# Patient Record
Sex: FEMALE | Race: WHITE | NOT HISPANIC OR LATINO | Employment: FULL TIME | URBAN - METROPOLITAN AREA
[De-identification: names, ages, dates, MRNs, and addresses within clinical notes are randomized per-mention and may not be internally consistent; named-entity substitution may affect disease eponyms.]

---
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
## 2018-01-01 ENCOUNTER — COMMUNICATION - HEALTHEAST (OUTPATIENT)
Dept: FAMILY MEDICINE | Facility: CLINIC | Age: 18
End: 2018-01-01

## 2018-07-23 ENCOUNTER — OFFICE VISIT - HEALTHEAST (OUTPATIENT)
Dept: FAMILY MEDICINE | Facility: CLINIC | Age: 18
End: 2018-07-23

## 2018-07-23 DIAGNOSIS — N76.0 VAGINITIS: ICD-10-CM

## 2018-07-23 DIAGNOSIS — N92.0 MENORRHAGIA: ICD-10-CM

## 2018-07-23 DIAGNOSIS — L70.9 ACNE: ICD-10-CM

## 2018-07-23 LAB
CLUE CELLS: NORMAL
TRICHOMONAS, WET PREP: NORMAL
YEAST, WET PREP: NORMAL

## 2018-07-23 ASSESSMENT — MIFFLIN-ST. JEOR: SCORE: 1384.1

## 2018-07-24 ENCOUNTER — COMMUNICATION - HEALTHEAST (OUTPATIENT)
Dept: FAMILY MEDICINE | Facility: CLINIC | Age: 18
End: 2018-07-24

## 2018-10-11 ENCOUNTER — COMMUNICATION - HEALTHEAST (OUTPATIENT)
Dept: FAMILY MEDICINE | Facility: CLINIC | Age: 18
End: 2018-10-11

## 2018-10-23 ENCOUNTER — COMMUNICATION - HEALTHEAST (OUTPATIENT)
Dept: FAMILY MEDICINE | Facility: CLINIC | Age: 18
End: 2018-10-23

## 2018-11-14 ENCOUNTER — OFFICE VISIT - HEALTHEAST (OUTPATIENT)
Dept: FAMILY MEDICINE | Facility: CLINIC | Age: 18
End: 2018-11-14

## 2018-11-14 DIAGNOSIS — J01.10 ACUTE NON-RECURRENT FRONTAL SINUSITIS: ICD-10-CM

## 2019-04-30 ENCOUNTER — OFFICE VISIT - HEALTHEAST (OUTPATIENT)
Dept: FAMILY MEDICINE | Facility: CLINIC | Age: 19
End: 2019-04-30

## 2019-04-30 DIAGNOSIS — K04.7 DENTAL INFECTION: ICD-10-CM

## 2019-04-30 ASSESSMENT — MIFFLIN-ST. JEOR: SCORE: 1408.77

## 2019-05-02 ENCOUNTER — COMMUNICATION - HEALTHEAST (OUTPATIENT)
Dept: FAMILY MEDICINE | Facility: CLINIC | Age: 19
End: 2019-05-02

## 2019-07-17 ENCOUNTER — OFFICE VISIT - HEALTHEAST (OUTPATIENT)
Dept: FAMILY MEDICINE | Facility: CLINIC | Age: 19
End: 2019-07-17

## 2019-07-17 DIAGNOSIS — Z00.00 ROUTINE GENERAL MEDICAL EXAMINATION AT A HEALTH CARE FACILITY: ICD-10-CM

## 2019-07-17 ASSESSMENT — MIFFLIN-ST. JEOR: SCORE: 1389.54

## 2019-07-18 LAB
BKR LAB AP ABNORMAL BLEEDING: NO
BKR LAB AP BIRTH CONTROL/HORMONES: NORMAL
BKR LAB AP CERVICAL APPEARANCE: NORMAL
BKR LAB AP GYN ADEQUACY: NORMAL
BKR LAB AP GYN INTERPRETATION: NORMAL
BKR LAB AP HPV REFLEX: NORMAL
BKR LAB AP LMP: NORMAL
BKR LAB AP PATIENT STATUS: NORMAL
BKR LAB AP PREVIOUS ABNORMAL: NORMAL
BKR LAB AP PREVIOUS NORMAL: NORMAL
C TRACH DNA SPEC QL PROBE+SIG AMP: NEGATIVE
HIGH RISK?: NO
N GONORRHOEA DNA SPEC QL NAA+PROBE: NEGATIVE
PATH REPORT.COMMENTS IMP SPEC: NORMAL
RESULT FLAG (HE HISTORICAL CONVERSION): NORMAL

## 2019-09-08 ENCOUNTER — COMMUNICATION - HEALTHEAST (OUTPATIENT)
Dept: FAMILY MEDICINE | Facility: CLINIC | Age: 19
End: 2019-09-08

## 2019-09-17 ENCOUNTER — OFFICE VISIT - HEALTHEAST (OUTPATIENT)
Dept: FAMILY MEDICINE | Facility: CLINIC | Age: 19
End: 2019-09-17

## 2019-09-17 DIAGNOSIS — N92.0 MENORRHAGIA WITH REGULAR CYCLE: ICD-10-CM

## 2019-09-17 DIAGNOSIS — Z23 FLU VACCINE NEED: ICD-10-CM

## 2019-09-17 RX ORDER — DESOGESTREL AND ETHINYL ESTRADIOL 0.15-0.03
1 KIT ORAL DAILY
Qty: 3 PACKAGE | Refills: 3 | Status: SHIPPED | OUTPATIENT
Start: 2019-09-17 | End: 2021-07-13

## 2019-09-17 ASSESSMENT — MIFFLIN-ST. JEOR: SCORE: 1394.97

## 2019-10-28 ENCOUNTER — OFFICE VISIT - HEALTHEAST (OUTPATIENT)
Dept: FAMILY MEDICINE | Facility: CLINIC | Age: 19
End: 2019-10-28

## 2019-10-28 DIAGNOSIS — N76.0 ACUTE VAGINITIS: ICD-10-CM

## 2019-10-28 LAB
CLUE CELLS: NORMAL
TRICHOMONAS, WET PREP: NORMAL
YEAST, WET PREP: NORMAL

## 2019-10-29 LAB
C TRACH DNA SPEC QL PROBE+SIG AMP: NEGATIVE
N GONORRHOEA DNA SPEC QL NAA+PROBE: NEGATIVE

## 2019-11-04 ENCOUNTER — COMMUNICATION - HEALTHEAST (OUTPATIENT)
Dept: FAMILY MEDICINE | Facility: CLINIC | Age: 19
End: 2019-11-04

## 2019-12-22 ENCOUNTER — COMMUNICATION - HEALTHEAST (OUTPATIENT)
Dept: SCHEDULING | Facility: CLINIC | Age: 19
End: 2019-12-22

## 2020-08-04 ENCOUNTER — COMMUNICATION - HEALTHEAST (OUTPATIENT)
Dept: FAMILY MEDICINE | Facility: CLINIC | Age: 20
End: 2020-08-04

## 2020-08-10 ENCOUNTER — OFFICE VISIT - HEALTHEAST (OUTPATIENT)
Dept: FAMILY MEDICINE | Facility: CLINIC | Age: 20
End: 2020-08-10

## 2020-08-10 DIAGNOSIS — R19.7 DIARRHEA, UNSPECIFIED TYPE: ICD-10-CM

## 2020-08-10 DIAGNOSIS — N92.0 MENORRHAGIA WITH REGULAR CYCLE: ICD-10-CM

## 2020-08-10 RX ORDER — ACETAMINOPHEN AND CODEINE PHOSPHATE 120; 12 MG/5ML; MG/5ML
1 SOLUTION ORAL DAILY
Qty: 90 TABLET | Refills: 2 | Status: SHIPPED | OUTPATIENT
Start: 2020-08-10 | End: 2021-07-13

## 2020-09-17 ENCOUNTER — COMMUNICATION - HEALTHEAST (OUTPATIENT)
Dept: FAMILY MEDICINE | Facility: CLINIC | Age: 20
End: 2020-09-17

## 2021-04-26 ENCOUNTER — COMMUNICATION - HEALTHEAST (OUTPATIENT)
Dept: CARDIOLOGY | Facility: CLINIC | Age: 21
End: 2021-04-26

## 2021-05-28 NOTE — PROGRESS NOTES
Assessment/Plan:    Dede Robertson is a 19 y.o. female presenting for:    1. Dental infection  I discussed with the patient that I do not believe that she has any sort of a tonsillar abscess that her tonsils actually look fine.  She does have a slightly inflamed submandibular lymph node on the left I think this is most likely due to her dental/gum infection.  She is allergic to amoxicillin and thus clindamycin will be sent to the pharmacy.  I did discuss that this has a tendency to give people diarrhea and I would like her to start taking a probiotic which she will do.    We discussed in detail the signs and symptoms that would necessitate further follow-up either here in clinic or in the emergency department.  She will keep me updated regarding her progress.  - clindamycin (CLEOCIN) 300 MG capsule; Take 1 capsule (300 mg total) by mouth 3 (three) times a day for 7 days.  Dispense: 21 capsule; Refill: 0        Medications Discontinued During This Encounter   Medication Reason     norgestimate-ethinyl estradiol (SPRINTEC, 28,) 0.25-35 mg-mcg per tablet            Chief Complaint:  Chief Complaint   Patient presents with     Oral Pain     L side swelling back by molers- that there was a popcorn kernal back there but now really swollen- x 1 wk     Headache     Since Friday- comes and goes     Fatigue     Comes when she has her headaches       Subjective:   Dede Robertson is a pleasant 19-year-old female presenting to the clinic today with concerns over pain in her mouth, throat and ear.  Patient notes that she was eating some popcorn about 4 5 days ago and had some pain behind her left lower molar.  She states that after a day or 2 she was able to extract the kernel from the area however the area was very swollen.  Sometimes when she eats he gets a little bit better.  She has not necessarily noted any tonsillar swelling.  She has been doing some salt water gargles.  Intermittent ibuprofen which helps a  "little.    She also notes that she has had some pressure on the left side of her face as well as some pain in her ear.  She notes a little bit of pain in her left submandibular lymph nodes as well.    Otherwise she is eating and drinking fairly well.  She is never had dental infections in the past.  She wonders about a tonsillar stone or abscess as she was doing some reading online.    12 point review of systems completed and negative except for what has been described above    Social History     Tobacco Use   Smoking Status Never Smoker   Smokeless Tobacco Never Used       Current Outpatient Medications   Medication Sig     clindamycin (CLINDAGEL) 1 % gel Use to face once daily     tretinoin (RETIN-A) 0.01 % gel Apply topically daily. Apply to affected area at bedtime     clindamycin (CLEOCIN) 300 MG capsule Take 1 capsule (300 mg total) by mouth 3 (three) times a day for 7 days.         Objective:  Vitals:    04/30/19 1014   BP: 98/64   Pulse: 76   Resp: 12   Temp: 97.7  F (36.5  C)   TempSrc: Oral   Weight: 133 lb 7 oz (60.5 kg)   Height: 5' 8\" (1.727 m)       Body mass index is 20.29 kg/m .    Vital signs reviewed and stable  General: No acute distress  Psych: Appropriate affect  HEENT: moist mucous membranes, pupils equal, round, reactive to light and accomodation, posterior oropharynx clear of erythema or exudate and tonsills are not at all enlarged, the tissue just behind the left lower molar is inflamed with a little bit of exudate or pus.  I do not feel any notable abscess.  The left lower molar is not tender to touch, tympanic membranes are pearly grey bilaterally  Lymph: no cervical or supraclavicular lymphadenopathy  Cardiovascular: regular rate and rhythm with no murmur  Pulmonary: clear to auscultation bilaterally with no wheeze  Abdomen: soft, non tender, non distended with normo-active bowel sounds  Extremities: warm and well perfused with no edema  Skin: warm and dry with no rash         This note " has been dictated and transcribed using voice recognition software.   Any errors in transcription are unintentional and inherent to the software.

## 2021-05-30 NOTE — PROGRESS NOTES
Assessment/Plan:     Health maintenance female exam.  All questions answered.  Await pap smear results.  Breast self exam technique reviewed and patient encouraged to perform self-exam monthly.  Discussed healthy lifestyle modifications.    1. Routine general medical examination at a health care facility  The patient and I had a very era discussion regarding birth control.  I strongly encouraged her to get on birth control she is not using condoms every time she has intercourse.  She agrees to try Seasonique.  This was sent to the pharmacy.  She is due to have her menstrual cycle in the next few days.  She will wait till after that is done to start.  Encouraged her to use a second form of protection even while she is on birth control but it does not become effective for about 10 to 14 days and she is aware of this.  - Gynecologic Cytology (PAP Smear)  - Tdap vaccine,  8yo or older,  IM  - L norgest/e.estradiol-e.estrad (SEASONIQUE) 0.15 mg-30 mcg (84)/10 mcg (7) 3MPk; Take 1 tablet by mouth daily.  Dispense: 1 Package; Refill: 3  - Chlamydia trachomatis & Neisseria gonorrhoeae, Amplified Detection          Subjective:      Dede Robertson is a 19 y.o. female who presents for an annual exam.  She is overall doing well.  She really has no questions or concerns today.  She is due for a Pap smear.    She is sexually active with one partner.  They have been dating for quite some time.  She is using condoms most times with intercourse.    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  She does not particularly like her job at this point and is looking for a different position.    Healthy Habits:   Regular Exercise: Yes  Sunscreen Use: Yes  Healthy Diet: Yes  Dental Visits Regularly: Yes  Seat Belt: Yes  Sexually active: Yes  Self Breast Exam Monthly:Yes  Colonoscopy: no  Prevention of Osteoporosis: Yes  Last Dexa: Yes    Immunization History   Administered Date(s) Administered     DTaP, historic  1998, 1998, 06/23/1999, 05/23/2003, 05/27/2008     Dtap 1998     HIB (HBOC) 1998     HPV Quadrivalent 07/31/2013, 10/11/2013, 04/10/2014     Hep B, Peds or Adolescent 1998     Hep B, historic 1998, 03/22/1999, 04/06/2008     Hib (PRP-T) 1998, 1998, 03/22/1999, 05/27/2008     IPV 1998, 1998, 06/23/1999, 05/23/2003     Influenza, Live, Nasal LAIV3 11/24/2008, 08/24/2009     Influenza, inj, historic,unspecified 11/26/2003, 12/15/2006, 08/24/2009     Influenza, nasal, historic 12/26/2003, 11/27/2007     Influenza, seasonal,quad inj 36+ mos 11/18/2016, 10/11/2017     Influenza, seasonal,quad inj 6-35 mos 11/10/2005, 11/24/2008, 10/19/2012, 10/19/2012, 11/26/2013, 12/01/2014     MMR 06/23/1999, 04/03/2001, 06/23/2008     Meningococcal B (PF) 06/02/2016, 07/20/2016     Meningococcal MCV4P 08/24/2009, 06/02/2016     Tdap 08/24/2009, 07/17/2019     Varicella 03/22/1999, 08/24/2009     Immunization status: up to date and documented.    Gynecologic History  Patient's last menstrual period was 06/19/2019 (exact date).  Contraception: condoms  Last Pap: n/a.      OB History   No data available       Current Outpatient Medications   Medication Sig Dispense Refill     L norgest/e.estradiol-e.estrad (SEASONIQUE) 0.15 mg-30 mcg (84)/10 mcg (7) 3MPk Take 1 tablet by mouth daily. 1 Package 3     No current facility-administered medications for this visit.      Past Medical History:   Diagnosis Date     Infectious mononucleosis 11/18/2015     Past Surgical History:   Procedure Laterality Date     WISDOM TOOTH EXTRACTION       Amoxicillin  Family History   Problem Relation Age of Onset     No Medical Problems Mother      No Medical Problems Father      Cancer Paternal Grandmother      Diabetes Paternal Grandfather      Heart disease Neg Hx      Social History     Socioeconomic History     Marital status: Single     Spouse name: Not on file     Number of children: Not on file  "    Years of education: Not on file     Highest education level: Not on file   Occupational History     Not on file   Social Needs     Financial resource strain: Not on file     Food insecurity:     Worry: Not on file     Inability: Not on file     Transportation needs:     Medical: Not on file     Non-medical: Not on file   Tobacco Use     Smoking status: Never Smoker     Smokeless tobacco: Never Used   Substance and Sexual Activity     Alcohol use: No     Drug use: No     Sexual activity: Never   Lifestyle     Physical activity:     Days per week: Not on file     Minutes per session: Not on file     Stress: Not on file   Relationships     Social connections:     Talks on phone: Not on file     Gets together: Not on file     Attends [employer] service: Not on file     Active member of club or organization: Not on file     Attends meetings of clubs or organizations: Not on file     Relationship status: Not on file     Intimate partner violence:     Fear of current or ex partner: Not on file     Emotionally abused: Not on file     Physically abused: Not on file     Forced sexual activity: Not on file   Other Topics Concern     Not on file   Social History Narrative    Lives with mother (Marybeth) and father (Kiko).    Older brother.       Review of Systems  General:  Denies problems  Eyes:  Denies problems  Ears/Nose/Throat:  Denies problems  Cardiovascular:  Denies problems  Respiratory:  Denies problems  Gastrointestinal:  Denies problems  Genitourinary:  Denies problems  Musculoskeletal:  Denies problems  Skin:  Denies problems, Neurologic:  Denies problems  Psychiatric:  Denies problems  Endocrine:  Denies problems  Heme/Lymphatic:  Denies problems  Allergic/Immunologic:  Denies problems       Objective:         Vitals:    07/17/19 1335   BP: 98/64   Pulse: 76   Resp: 16   Temp: 98  F (36.7  C)   TempSrc: Oral   Weight: 130 lb 3 oz (59.1 kg)   Height: 5' 7.72\" (1.72 m)       Physical Exam:  General Appearance: Alert, " cooperative, no distress, appears stated age   Head: Normocephalic, without obvious abnormality, atraumatic  Eyes: PERRL, conjunctiva/corneas clear, EOM's intact   Ears: Normal TM's and external ear canals, both ears  Nose:Nares normal, septum midline,mucosa normal, no drainage    Throat:Lips, mucosa, and tongue normal; teeth and gums normal  Neck: Supple, symmetrical, trachea midline, no adenopathy;  thyroid: not enlarged, symmetric, no tenderness/mass/nodules  Back: Symmetric, no curvature, ROM normal,  Lungs: Clear to auscultation bilaterally, respirations unlabored  Breasts: No breast masses, tenderness, asymmetry, or nipple discharge.  Heart: Regular rate and rhythm, S1 and S2 normal, no murmur, rub, or gallop  Abdomen: Soft, non-tender, bowel sounds active all four quadrants,  no masses, no organomegaly  Pelvic:normal external female genitalia, normal appearing vaginal mucosa and cervix  Extremities: Extremities normal, atraumatic, no cyanosis or edema  Skin: Skin color, texture, turgor normal, no rashes or lesions  Lymph nodes: Cervical, supraclavicular, and axillary nodes normal and   Neurologic: Normal

## 2021-05-31 VITALS — HEIGHT: 68 IN | BODY MASS INDEX: 20.45 KG/M2 | WEIGHT: 134.9 LBS

## 2021-05-31 VITALS — HEIGHT: 68 IN | WEIGHT: 133.7 LBS | BODY MASS INDEX: 20.26 KG/M2

## 2021-05-31 VITALS — WEIGHT: 131.25 LBS | BODY MASS INDEX: 19.89 KG/M2 | HEIGHT: 68 IN

## 2021-05-31 VITALS — BODY MASS INDEX: 19.87 KG/M2 | WEIGHT: 130.7 LBS

## 2021-06-01 VITALS — BODY MASS INDEX: 19.4 KG/M2 | WEIGHT: 128 LBS | HEIGHT: 68 IN

## 2021-06-01 NOTE — TELEPHONE ENCOUNTER
Dr. Giang-  See pt's Social IQ (Social Influence Quotient) message and reply via Social IQ (Social Influence Quotient).

## 2021-06-01 NOTE — PROGRESS NOTES
Assessment/Plan:    Dede Robertson is a 19 y.o. female presenting for:    1. Menorrhagia with regular cycle  She seemed to be having breakthrough bleeding with the Seasonique.  She is tried Sprintec in the past but this gave her acne.  She feels as though he has caused her some emotional concerns.  A pre-sent to the pharmacy.  Hopefully the formulation of progesterone desogestrel will not cause her any skin concerns.  I discussed that if she may have some spotting throughout her cycles for the next month or 2 but hopefully by 3 months she will be accustomed to the medication.  - desogestrel-ethinyl estradiol (APRI) 0.15-0.03 mg per tablet; Take 1 tablet by mouth daily.  Dispense: 3 Package; Refill: 3    2. Flu vaccine need  - Influenza,Seasonal,Quad,INJ =/>6months        Medications Discontinued During This Encounter   Medication Reason     L norgest/e.estradiol-e.estrad (SEASONIQUE) 0.15 mg-30 mcg (84)/10 mcg (7) 3MPk            Chief Complaint:  Chief Complaint   Patient presents with     Contraception     Discuss birth control she's taking currently       Subjective:   Dede Robertson is a pleasant 19-year-old female presenting to the clinic today to discuss her birth control.  I seen the patient for physical about 2 months ago.  At that time she stated that she was sexually active but not using protection because of that she was started on oral contraceptive pills.  She had been on several pills in the past including Erlinda (which she felt as though she had emotional issues with), Sprintec which she felt made her break out and Ortho Tri-Cyclen which she had breakthrough spotting.  Most recently she was started on Seasonique.  She noted that after about 6 weeks she began to have some spotting.    She comes to the clinic today for further evaluation.  She was having some cramping but that has abated.      12 point review of systems completed and negative except for what has been described above    Social History  "    Tobacco Use   Smoking Status Never Smoker   Smokeless Tobacco Never Used       Current Outpatient Medications   Medication Sig     desogestrel-ethinyl estradiol (APRI) 0.15-0.03 mg per tablet Take 1 tablet by mouth daily.         Objective:  Vitals:    09/17/19 1642   BP: 110/68   Pulse: 76   Resp: 16   Temp: 98.4  F (36.9  C)   TempSrc: Oral   Weight: 131 lb 6 oz (59.6 kg)   Height: 5' 7.72\" (1.72 m)       Body mass index is 20.14 kg/m .    Vital signs reviewed and stable  General: No acute distress  Psych: Appropriate affect  HEENT: moist mucous membranes  Lymph: no cervical or supraclavicular lymphadenopathy  Cardiovascular: regular rate and rhythm with no murmur  Pulmonary: clear to auscultation bilaterally with no wheeze  Abdomen: soft, non tender, non distended with normo-active bowel sounds  Extremities: warm and well perfused with no edema  Skin: warm and dry with no rash   genitourinary: Normal external female genitalia, normal-appearing vaginal mucosa and cervix.           This note has been dictated and transcribed using voice recognition software.   Any errors in transcription are unintentional and inherent to the software.  "

## 2021-06-02 VITALS — WEIGHT: 134.8 LBS | BODY MASS INDEX: 20.5 KG/M2

## 2021-06-02 NOTE — PROGRESS NOTES
Assessment/Plan:    Dede Robertson is a 19 y.o. female presenting for:    1. Acute vaginitis  Wet prep is negative today.  Given her clinical examination as well as her symptoms I will treat with 1 tablet of Diflucan.  Discussed the risks and most common side effects of the medication.  She will contact me if she is not feeling better in the next week or so.  GC chlamydia is pending.  - Wet Prep, Vaginal  - Chlamydia trachomatis & Neisseria gonorrhoeae, Amplified Detection  - fluconazole (DIFLUCAN) 150 MG tablet; Take 1 tablet (150 mg total) by mouth once for 1 dose. OK to repeat in 3 days for continued symptoms.  Dispense: 2 tablet; Refill: 0        There are no discontinued medications.        Chief Complaint:  Chief Complaint   Patient presents with     Vaginitis       Subjective:   Dede Robertson is a pleasant 19-year-old female presented to the clinic today with concerns over vaginitis.  The patient states that when she is on her.  She generally uses tampons.  She felt as though she forgot a tampon in the vaginal canal and believes that she irritated the vaginal canal she was looking for it.  She has noted a small amount of white discharge.  Some irritation on both internally and externally.  Really no concerns for STDs.    12 point review of systems completed and negative except for what has been described above    Social History     Tobacco Use   Smoking Status Never Smoker   Smokeless Tobacco Never Used       Current Outpatient Medications   Medication Sig     desogestrel-ethinyl estradiol (APRI) 0.15-0.03 mg per tablet Take 1 tablet by mouth daily.     fluconazole (DIFLUCAN) 150 MG tablet Take 1 tablet (150 mg total) by mouth once for 1 dose. OK to repeat in 3 days for continued symptoms.         Objective:  Vitals:    10/28/19 1452   BP: 122/74   Pulse: 88   Resp: 12   Temp: 97.9  F (36.6  C)   TempSrc: Oral   Weight: 126 lb (57.2 kg)       Body mass index is 19.32 kg/m .    Vital signs reviewed and  stable  General: No acute distress  Psych: Appropriate affect  HEENT: moist mucous membranes  Abdomen: soft, non tender, non distended with normo-active bowel sounds  Genitourinary: External genitalia is slightly erythematous, vaginal mucosa has some whitish chunky discharge.  No foul-smelling discharge.  Wet prep and gonorrhea chlamydia test obtained.  Extremities: warm and well perfused with no edema  Skin: warm and dry with no rash         This note has been dictated and transcribed using voice recognition software.   Any errors in transcription are unintentional and inherent to the software.

## 2021-06-03 VITALS
BODY MASS INDEX: 19.91 KG/M2 | DIASTOLIC BLOOD PRESSURE: 68 MMHG | HEART RATE: 76 BPM | WEIGHT: 131.38 LBS | HEIGHT: 68 IN | SYSTOLIC BLOOD PRESSURE: 110 MMHG | RESPIRATION RATE: 16 BRPM | TEMPERATURE: 98.4 F

## 2021-06-03 VITALS
SYSTOLIC BLOOD PRESSURE: 122 MMHG | TEMPERATURE: 97.9 F | DIASTOLIC BLOOD PRESSURE: 74 MMHG | HEART RATE: 88 BPM | WEIGHT: 126 LBS | BODY MASS INDEX: 19.32 KG/M2 | RESPIRATION RATE: 12 BRPM

## 2021-06-03 VITALS — WEIGHT: 130.19 LBS | HEIGHT: 68 IN | BODY MASS INDEX: 19.73 KG/M2

## 2021-06-03 VITALS — WEIGHT: 133.44 LBS | BODY MASS INDEX: 20.22 KG/M2 | HEIGHT: 68 IN

## 2021-06-04 NOTE — TELEPHONE ENCOUNTER
Pt calling that pt has fever 101.4, started feeling sick yesterday morning - pt coughing, chills, body aches, throat hurts.  Pt was exposed to people who were sick earlier in the week.  Pt intends to monitor and follow Care Advice and will f/u with clinic if needed.    Mom intends to monitor and follow Care Advice and will f/u with clinic if needed.

## 2021-06-10 NOTE — PROGRESS NOTES
"Dede Robertson is a 20 y.o. female who is being evaluated via a billable video visit.      The patient has been notified of following:     \"This video visit will be conducted via a call between you and your physician/provider. We have found that certain health care needs can be provided without the need for an in-person physical exam.  This service lets us provide the care you need with a video conversation.  If a prescription is necessary we can send it directly to your pharmacy.  If lab work is needed we can place an order for that and you can then stop by our lab to have the test done at a later time.    Video visits are billed at different rates depending on your insurance coverage. Please reach out to your insurance provider with any questions.    If during the course of the call the physician/provider feels a video visit is not appropriate, you will not be charged for this service.\"    Patient has given verbal consent to a Video visit? Yes  How would you like to obtain your AVS? AVS Preference: [facility].  If dropped by the video visit, the video invitation should be sent to: [place]  Will anyone else be joining your video visit? No        Video Start Time: 9:01 AM    -------------------------    Assessment/Plan:    Dede Robertson is a 20 y.o. female presenting for:    1. Menorrhagia with regular cycle  Because her diarrhea does seem somewhat related to the increase in estrogen after her sugar pill week I think it is reasonable to try progesterone only birth control.  She is toward the end of her pill pack and will skip her placebo week and start taking her progesterone only tablets to be fully covered.  Discussed that she needs to take these at the same time every day.  If this tends to work well would recommend a longer acting implant such as a Nexplanon, IUD or even the Depo-Provera shot.  - norethindrone (ORTHO MICRONOR) 0.35 mg tablet; Take 1 tablet (0.35 mg total) by mouth daily.  Dispense: 90 " tablet; Refill: 2    2. Diarrhea, unspecified type  Etiology is unclear.  Probiotics do seem to be helping.  I do wonder if her one episode of more severe diarrhea was potentially due to a viral infection.  We will switch her birth control to progesterone only to see if this helps.  If it does not would recommend either seeing GI or potentially could consider doing a colonoscopy to look for either Crohn's or inflammatory bowel disease.    If colonoscopy is negative could consider doing a trial of Bentyl to see if this is beneficial as well if needed.        There are no discontinued medications.        Chief Complaint:  Chief Complaint   Patient presents with     Abdominal Pain     Pain started Friday of last wk- has been off and on     Medication Refill     Birth control       Subjective:   Dede Robertson is a pleasant 20 y.o. female being evaluated via video visit today for the following concern/s:     1.  Diarrhea: Patient notes that she has had an upset stomach for the last few months.  She is taking a probiotic which seems to help.  She notes that when she was eating beef her stomach would get very upset and she has cut that out which did seem to help a little.  She does sometimes have some pain and pressure in her stomach which is generally on the left-hand side which is relieved with bowel movements.  She notes that about 10 days ago she went for a run.  She then came home and ate dinner and was feeling fine however about an hour or 2 later she began to have intense abdominal pain and was up most of the night with nonbloody diarrhea.    She noted blood in her stool one time when she was a bit constipated and note this this was a scant amount.  Otherwise no blood in the stool.  She does notice that the diarrhea does not correlate with her menstrual cycle but does sometimes correlate with restarting her birth control after her placebo week.    2.  Medication refill: Patient needs a refill of her birth  control.      12 point review of systems completed and negative except for what has been described above    Social History     Tobacco Use   Smoking Status Never Smoker   Smokeless Tobacco Never Used       Current Outpatient Medications   Medication Sig     desogestrel-ethinyl estradiol (APRI) 0.15-0.03 mg per tablet Take 1 tablet by mouth daily.     norethindrone (ORTHO MICRONOR) 0.35 mg tablet Take 1 tablet (0.35 mg total) by mouth daily.         Objective:  No flowsheet data found.        There is no height or weight on file to calculate BMI.    General: No acute distress  Psych: Appropriate affect  HEENT: moist mucous membranes  Pulmonary: Breathing comfortably, speaking in complete sentences  Extremities: warm and well perfused with no edema  Skin: warm and dry with no rash       This note has been dictated and transcribed using voice recognition software.   Any errors in transcription are unintentional and inherent to the software.      Video-Visit Details    Type of service:  Video Visit    Video End Time (time video stopped): 9:22 AM  Originating Location (pt. Location): Home    Distant Location (provider location):  [facility]     Platform used for Video Visit: Nighat Giang MD

## 2021-06-11 NOTE — PROGRESS NOTES
ASSESSMENT/ PLAN    1. Acne  2. Heavy periods  16-year-old female here for refill of her birth control that was originally prescribed for heavy periods and acne.  She reports that her symptoms have not improved.  She tolerates Ortho Tri-Cyclen well without any significant side effects.  She would like a different birth control that will address her acne and her periods little bit more.  Reasonable to switch her to Lakshmi which is FDA approved for acne treatment.  Patient comfortable with this plan. Prescription sent.  Advised patient to return for follow-up and let us know if symptoms are not improved. Maybe consider spironolactone in the future.   - drospirenone-ethinyl estradiol (LAKSHMI, 28,) 3-0.02 mg per tablet; Take 1 tablet by mouth daily.  Dispense: 84 tablet; Refill: 3    Lucy Giordano MD      SUBJECTIVE   Dede Robertson is a 17 y.o. old female with a past medical history of heavy periods and acne who presented to clinic today for follow-up of birth control refill.  She has been on Ortho Tri-Cyclen for the last year, tolerating the medication well.  Denies any abnormal intermenstrual vaginal bleeding, or any other symptoms.  Ortho Tri-Cyclen was originally prescribed for heavy periods and acne and patient reports that the symptoms have not improved significantly.  She continues to have acne as well as heavy periods associated with cramps.  She has been followed by her dermatologist to address her acne and was given oral antibiotics as well as topical tretinoin cream without any relief of her acne.  He has not tried oral tretinoin for her acne yet.     Review of Systems:  A 12 point comprehensive review of systems was negative except as noted in HPI.    Medical History  Active Ambulatory (Non-Hospital) Problems    Diagnosis     Heavy periods     Acne     Past Medical History:   Diagnosis Date     Infectious mononucleosis 11/18/2015       Surgical History  She  has a past surgical history that includes [place]  "tooth extraction.    Social History  Reviewed, and she  reports that she has never smoked. She does not have any smokeless tobacco history on file.    Family History  Reviewed, and family history includes Cancer in her paternal grandmother; Diabetes in her paternal grandfather; No Medical Problems in her father and mother. There is no history of Heart disease.    Medications  Reviewed and reconciled.      Allergies  Allergies   Allergen Reactions     Amoxicillin          OBJECTIVE  Physical Exam:  Vital signs: /62 (Patient Site: Right Arm, Patient Position: Sitting, Cuff Size: Adult Regular)  Pulse 78  Resp 16  Ht 5' 8.25\" (1.734 m)  Wt 134 lb 14.4 oz (61.2 kg)  LMP 06/29/2017 (Exact Date)  Breastfeeding? No  BMI 20.36 kg/m2  Weight: 134 lb 14.4 oz (61.2 kg)    General appearance: pleasant, appears stated age, cooperative and in no distress  Eyes: EOMs intact, PERRL, conjunctivae normal.   ENT: moist oral mucosa, posterior oropharynx normal.  Lymph: no cervical/supraclavicular adenopathy  Respiratory: clear to auscultation bilaterally, good air movement throughout, no wheezing or crackles, speaking full sentences without difficulty  Cardiovascular: regular rate and rhythm, no murmur appreciated, no leg edema  Abdomen: active bowel sounds, soft, non-tender, non-distended, no CVA tenderness on percussion.  Skin: no rashes  Neuro: alert oriented x 3, grossly normal otherwise  Psych: normal affect, appropriate conversation       "

## 2021-06-12 NOTE — PROGRESS NOTES
"  Subjective:       Dede Robertson is a 17 y.o. female who presents for a discussion of menorrhagia and acne.  She saw Dr. Giordano for this in July, was changed from Ortho Tri-Cyclen to Lakshmi for its beneficial effects on acne.  She finds that her skin looks a bit better than previous, she also notes that her periods seem to be a bit lighter and shorter than previous.  She still experiences some cramping with these.  She still does have some comedonal acne.  She stopped using her tretinoin for unclear reasons.  With dermatology, she was placed on an oral antibiotic and tretinoin.  She has no other questions or concerns today.    The following portions of the patient's history were reviewed and updated as appropriate: allergies, current medications, past medical history and problem list.    Review of Systems   Pertinent items are noted in HPI.      Objective:      /64 (Patient Site: Right Arm, Patient Position: Sitting, Cuff Size: Adult Regular)  Pulse 76  Resp 16  Ht 5' 8\" (1.727 m)  Wt 133 lb 11.2 oz (60.6 kg)  LMP 08/24/2017 (Exact Date)  Breastfeeding? No  BMI 20.33 kg/m2    General appearance: alert, appears stated age and cooperative  Lungs: clear to auscultation bilaterally  Heart: regular rate and rhythm, S1, S2 normal, no murmur, click, rub or gallop  Skin: comedonal acne forehead, cheeks, chin        Assessment/Plan:       1. Acne vulgaris  Discussed that she can certainly stay on Lakshmi if this seems beneficial for her acne.  Discussed increased risk of blood clots with drospirenone.  Overall the risk remains low and she has no personal or family history of blood clots.  Also recommended restarting tretinoin and discussed her daily skin care routine.  She can follow-up as needed.  - tretinoin (RETIN-A) 0.01 % gel; Apply topically daily. Apply to affected area at bedtime  Dispense: 45 g; Refill: 5  - drospirenone-ethinyl estradiol (LAKSHMI, 28,) 3-0.02 mg per tablet; Take 1 tablet by mouth daily.  " Dispense: 84 tablet; Refill: 3    2. Menorrhagia with regular cycle  Doing well on Lakshmi at present.  Happy with these results.  She will continue this.  - drospirenone-ethinyl estradiol (LAKSHMI, 28,) 3-0.02 mg per tablet; Take 1 tablet by mouth daily.  Dispense: 84 tablet; Refill: 3

## 2021-06-13 NOTE — PROGRESS NOTES
"Assessment/Plan:    Dede Robertson is a 17 y.o. female presenting for:    1. Abdominal pain  Suspicion is that this pain has more to do with possibly mild reflux or a gastroenteritis.  We will do an ultrasound to rule out gallstones and also to look at the pancreas.  Lab work done recently was normal.  I recommended that she begin taking a probiotic as well as Zantac once or twice daily for a few weeks to see if this will help as well.  Certainly these are not things that she will need to be on long-term.  She will keep me updated and we discussed Signs and symptoms that would necessitate further follow-up.    - US Abdomen Complete; Future        There are no discontinued medications.        Chief Complaint:  Chief Complaint   Patient presents with     Abdominal Pain     Started with \"looser\" stool then some stomach cramping on the L- now it is on both sides of abd- cramping goes away after a bowel movement but pain is still there       Subjective:   Dede Robertson He is a pleasant 17-year-old female presenting to the clinic today with her mother to discuss some stomach cramping and loose stools.  She does not really have history of issues with  gastroenterologic processes.  She states about 3 weeks ago she was having some mild stomach cramping and looser stools.  The loose stools lasted for a few weeks and the cramping became worse.  Mostly the cramping was in the upper left originally and then was the upper right.  She was actually seen at urgent care a few days ago where lab work including CBC and lipase were done which were normal.  This is of course reassuring however no cause of her pain was found at that point.  She feels as though she is having normal bowel movements now and her loose stools have overall abated.  She does not feel at all constipated.  She notes that her pain will come and go but is sometimes worse after eating.  It was at its very worst after eating a hamburger a few days ago.  Again " "it is both in the right and left upper quadrants.  At the moment her pain is rated at a 1 out of 10 and is very mild.  She cannot do anything necessarily to exacerbate the pain such as moved to different positions or lift something heavy.  Sometimes after bowel movement her pain is improved but not always.  No blood in the stool.    No fevers or chills.  She has not been sick recently.    12 point review of systems completed and negative except for what has been described above    History   Smoking Status     Never Smoker   Smokeless Tobacco     Never Used       Current Outpatient Prescriptions   Medication Sig     drospirenone-ethinyl estradiol (LAKSHMI, 28,) 3-0.02 mg per tablet Take 1 tablet by mouth daily.     tretinoin (RETIN-A) 0.01 % gel Apply topically daily. Apply to affected area at bedtime         Objective:  Vitals:    10/11/17 1039   BP: 110/68   Pulse: 72   Resp: 16   Temp: 97.8  F (36.6  C)   TempSrc: Oral   Weight: 131 lb 4 oz (59.5 kg)   Height: 5' 8\" (1.727 m)       Vital signs reviewed and stable  General: No acute distress  Psych: Appropriate affect  HEENT: moist mucous membranes, pupils equal, round, reactive to light and accomodation, posterior oropharynx clear of erythema or exudate, tympanic membranes are pearly grey bilaterally  Lymph: no cervical or supraclavicular lymphadenopathy  Cardiovascular: regular rate and rhythm with no murmur  Pulmonary: clear to auscultation bilaterally with no wheeze  Abdomen: soft, mild tenderness in epigastrum and RUQ/LUQ, non distended with normo-active bowel sounds  Extremities: warm and well perfused with no edema  Skin: warm and dry with no rash         This note has been dictated and transcribed using voice recognition software.   Any errors in transcription are unintentional and inherent to the software.  "

## 2021-06-13 NOTE — PROGRESS NOTES
Subjective:   Dede Robertson is a 17 y.o. female  Roomed by: suraj      Chief Complaint   Patient presents with     Abdominal Pain     under bilateral ribs, left side x 4 days, right side x 2 days   Admits about 2-3 weeks of looser stools. Has been going to the [place] and riding a bike and doing leg presses for the past month. Says her back pack has been heavy. Says she has never had this type of pain before. Denies any sore throat, runny nose, cough, fevers, chills, CP or CP. Admits intermittent constipation for the last 3 weeks. Appetite has been lower. Denies any nausea or vomiting. Says that 2 days ago didn't eat until the evening, ate a cheeseburger and started getting stomach cramps for 4 hours. Denies any alcohol use. Stopped drinking caffeine when her stomach started to bother her. Was drinking coffee about 4 times a week. Mom denies any family history or colon problems. Says the pain does not stop her from doing her usual activities. Tried some ibuprofen 3 days ago for a headache, and says it helped with the headache and belly pain. Says the heaviest that she lifts is her dog, who weighs about 40 pounds. Denies any recent urinary frequency, urgency, or dysuria. Says right side of her back will hurt when the the right side of her abdomen hurts. Today admits right sided intermittent belly and back pain today from 6 am to 3 pm. Says eating does not change the pain. Says pain feels like a pinching sensation off and on. She missed a class today. Works and is in school. LMP was 9//22. Admits having Mono in 2015.     Review of Systems  MS -GI  - see HPI  Allergies   Allergen Reactions     Amoxicillin        Current Outpatient Prescriptions:      drospirenone-ethinyl estradiol (LAKSHMI, 28,) 3-0.02 mg per tablet, Take 1 tablet by mouth daily., Disp: 84 tablet, Rfl: 3     tretinoin (RETIN-A) 0.01 % gel, Apply topically daily. Apply to affected area at bedtime, Disp: 45 g, Rfl: 5  Patient Active Problem List   Diagnosis      Acne     Heavy periods     Medical History Reviewed  Objective:     Vitals:    10/09/17 1552   BP: 120/70   Pulse: (!) 104   Temp: 98.3  F (36.8  C)   TempSrc: Oral   SpO2: 99%   Weight: 130 lb 11.2 oz (59.3 kg)   Gen - Pt in NAD  Eyes - Conjunctiva non injected, no drainage  Face - not TTP over frontal sinus areas; not TTP over maxillary area  Ears - external canals - no induration, Right TM - not injected, Left TM - not injected   Nose - not congested, no nasal drainage  Pharynx - non injected, tonsils 1+ size  Neck - supple, no cervical adenopathy, no masses  Cor - mildly tachycardic, regular rhythm w/o murmur  Lungs - Good air entry; no wheezes or crackles noted on auscultation - no coughing noted  Abdomen: Flat, soft, slightly hyperactive BS, no HSM or masses, no rebound or guarding  Skin - no lesions, no rashes noted    Results for orders placed or performed in visit on 10/09/17   Mononucleosis Screen   Result Value Ref Range    Mono Screen Negative Negative   Urinalysis-UC if Indicated   Result Value Ref Range    Color, UA Yellow Colorless, Yellow, Straw, Light Yellow    Clarity, UA Clear Clear    Glucose, UA Negative Negative    Bilirubin, UA Negative Negative    Ketones, UA Negative Negative    Specific Gravity, UA 1.010 1.005 - 1.030    Blood, UA Negative Negative    pH, UA 7.0 5.0 - 8.0    Protein, UA Negative Negative mg/dL    Urobilinogen, UA 0.2 E.U./dL 0.2 E.U./dL, 1.0 E.U./dL    Nitrite, UA Negative Negative    Leukocytes, UA Negative Negative   Hemoglobin   Result Value Ref Range    Hemoglobin 13.2 12.0 - 16.0 g/dL   Microscopic not indicated  UC not indicated  Lab results discussed on day of visit.      Assessment - Plan   Medical Decision Making -17-year-old girl presenting with pain under both sides of her ribs for the last 4 days.  She also admits having looser stools for the past 2 weeks.  Denies having other systemic symptoms.  Has any change in her exercise routine or lifting any heavier  objects than her dog which is 40 pounds.  Denies any recent injury.  Her mother who was with her denied any family history of stomach or colon problems.  Her exam was normal except for some hyperactive bowel sounds.  From her presentation and exam, did not thing that imaging was indicated.  Patient's Monospot and urinalysis were negative and hemoglobin was normal.  Discussed with patient and mother that I could not tell them the reason for patient's discomfort that she needs to follow with her primary if her symptoms are not improving over the next several days.    1. Epigastric pain  - Mononucleosis Screen  - Heidi-Barr Virus (EBV) Antibody Profile  - Urinalysis-UC if Indicated  - Hemoglobin    At the conclusion of the encounter, assessment and plan were discussed.   All questions were answered.   The patient or guardian acknowledged understanding and was involved in the decision making regarding the overall care plan.    Patient Instructions   1. Continue drinking plenty of non-caffeine liquids   2. Tylenol or ibuprofen for fever or pain  3. If symptoms are not improving over the next week, follow up with primary provider  4. If symptoms are getting worse or you have any questions, call the clinic number

## 2021-06-15 PROBLEM — N92.0 HEAVY PERIODS: Status: ACTIVE | Noted: 2017-05-01

## 2021-06-19 NOTE — PROGRESS NOTES
"Assessment/Plan:    Dede Robertson is a 18 y.o. female presenting for:    1. Vaginitis  Wet prep is negative today.  Reassurance was given.  - Wet Prep, Vaginal    2. Menorrhagia  We will try her on a different oral contraceptive pill.  Hopefully this will help with her dysmenorrhea and menorrhagia.  She will keep me updated regarding the results.  I discussed that this is not necessarily a pill that we would prescribe first-line for acne however potentially the hormonal stabilization afforded by the pill will be beneficial.  - norgestimate-ethinyl estradiol (SPRINTEC, 28,) 0.25-35 mg-mcg per tablet; Take 1 tablet by mouth daily.  Dispense: 3 Package; Refill: 3    3. Acne  We discussed that the birth control pill might help.  Otherwise clindamycin gel was sent to the pharmacy.  We could consider Sprintec in the future as well but I did not want to start her on 2 different oral medications at the same time.        Medications Discontinued During This Encounter   Medication Reason     drospirenone-ethinyl estradiol (LAKSHMI, 28,) 3-0.02 mg per tablet            Chief Complaint:  Chief Complaint   Patient presents with     Skin Problem     Facial bumps/acne x 2 months- never used to get them on her forehead until recently- denies any changes with anything       Subjective:   Dede Robertson is a pleasant 18-year-old female presenting to the clinic today for several concerns:    1.  Vaginitis: The patient notes that over the last few weeks she has had intermittent \"pinching\" pain in the vaginal area.  Some mild increased discharge on occasion but nothing continuous.    2.  Menorrhagia: The patient was previously on gas birth control for both menorrhagia as well as to help with her skin.  She is unsure if it helped with her skin but she does not think that it really helped with her cramping or menstrual cycle.  She is hopeful to try different medication that may be beneficial.    3.  Acne: Patient has been dealing with " "acne for the past several years.  She does not think that the has helped however after she stopped this several months ago she does think that her acne became a bit worse.  She notes mostly that she is having papular acne on her forehead but she does get some cystic lesions on her face.  In the past she has been on antibiotics for acne but she does not wish to do this again.    12 point review of systems completed and negative except for what has been described above    History   Smoking Status     Never Smoker   Smokeless Tobacco     Never Used       Current Outpatient Prescriptions   Medication Sig     tretinoin (RETIN-A) 0.01 % gel Apply topically daily. Apply to affected area at bedtime     clindamycin (CLINDAGEL) 1 % gel Use to face once daily     norgestimate-ethinyl estradiol (SPRINTEC, 28,) 0.25-35 mg-mcg per tablet Take 1 tablet by mouth daily.         Objective:  Vitals:    07/23/18 1533   BP: 110/62   Pulse: 76   Resp: 16   Temp: 98.5  F (36.9  C)   TempSrc: Oral   Weight: 128 lb (58.1 kg)   Height: 5' 8\" (1.727 m)       Vital signs reviewed and stable  General: No acute distress  Psych: Appropriate affect  HEENT: moist mucous membranes, pupils equal, round, reactive to light and accomodation, posterior oropharynx clear of erythema or exudate, tympanic membranes are pearly grey bilaterally  Lymph: no cervical or supraclavicular lymphadenopathy  Cardiovascular: regular rate and rhythm with no murmur  Pulmonary: clear to auscultation bilaterally with no wheeze  Abdomen: soft, non tender, non distended with normo-active bowel sounds  Extremities: warm and well perfused with no edema  Skin: warm and dry with no rash, a few areas of cystic acne on her cheekbones.  She does have papular acne on her forehead and chin.         This note has been dictated and transcribed using voice recognition software.   Any errors in transcription are unintentional and inherent to the software.  "

## 2021-06-21 NOTE — PROGRESS NOTES
Assessment/Plan:    Dede Robertson is a 18 y.o. female presenting for:    1. Acute non-recurrent frontal sinusitis  We discussed some symptomatic treatments which she could try including Mucinex, [place] pot as well as warm compresses.  If these are not effective in the next few days she can  the doxycycline and begin taking it.  Discussed the most common side effects of the medication.  All questions were answered today.  - doxycycline (VIBRA-TABS) 100 MG tablet; Take 1 tablet (100 mg total) by mouth 2 (two) times a day for 7 days.  Dispense: 14 tablet; Refill: 0        There are no discontinued medications.        Chief Complaint:  Chief Complaint   Patient presents with     Headache     x 1 week - pt also complains of pressure in her sinuses and nose.         Subjective:   Dede Robertson is a very pleasant 18-year-old female presenting to the clinic today with concerns over facial pressure and sinus symptoms.  The patient notes that about 10 days ago she began to get some frontal facial pressure.  She also had a mild cough as well as rhinorrhea.  The rhinorrhea has somewhat abated but she continues to have the pressure in her frontal sinus.  She has had sinus infections in the past but does not get them often.  She believes that she may be had some fevers initially but has not had any for the last few days.  She is actually been feeling well per her report except for this fairly bothersome pressure.  She has been taking Advil Cold and Sinus which does seem to transiently help.    12 point review of systems completed and negative except for what has been described above    Social History     Tobacco Use   Smoking Status Never Smoker   Smokeless Tobacco Never Used       Current Outpatient Medications   Medication Sig     clindamycin (CLINDAGEL) 1 % gel Use to face once daily     norgestimate-ethinyl estradiol (SPRINTEC, 28,) 0.25-35 mg-mcg per tablet Take 1 tablet by mouth daily.     doxycycline  (VIBRA-TABS) 100 MG tablet Take 1 tablet (100 mg total) by mouth 2 (two) times a day for 7 days.     tretinoin (RETIN-A) 0.01 % gel Apply topically daily. Apply to affected area at bedtime         Objective:  Vitals:    11/14/18 0951   BP: 118/70   Pulse: 84   Weight: 134 lb 12.8 oz (61.1 kg)       Body mass index is 20.5 kg/m .    Vital signs reviewed and stable  General: No acute distress  Psych: Appropriate affect  HEENT: moist mucous membranes, pupils equal, round, reactive to light and accomodation, posterior oropharynx clear of erythema or exudate, tympanic membranes are pearly grey bilaterally  Lymph: no cervical or supraclavicular lymphadenopathy  Cardiovascular: regular rate and rhythm with no murmur  Pulmonary: clear to auscultation bilaterally with no wheeze  Abdomen: soft, non tender, non distended with normo-active bowel sounds  Extremities: warm and well perfused with no edema  Skin: warm and dry with no rash         This note has been dictated and transcribed using voice recognition software.   Any errors in transcription are unintentional and inherent to the software.

## 2021-07-09 ENCOUNTER — COMMUNICATION - HEALTHEAST (OUTPATIENT)
Dept: FAMILY MEDICINE | Facility: CLINIC | Age: 21
End: 2021-07-09

## 2021-07-13 ENCOUNTER — VIRTUAL VISIT (OUTPATIENT)
Dept: FAMILY MEDICINE | Facility: CLINIC | Age: 21
End: 2021-07-13
Payer: COMMERCIAL

## 2021-07-13 DIAGNOSIS — L30.9 DERMATITIS: Primary | ICD-10-CM

## 2021-07-13 PROCEDURE — 99213 OFFICE O/P EST LOW 20 MIN: CPT | Mod: 95 | Performed by: FAMILY MEDICINE

## 2021-07-13 RX ORDER — TRIAMCINOLONE ACETONIDE 1 MG/G
CREAM TOPICAL 2 TIMES DAILY
Qty: 45 G | Refills: 1 | Status: SHIPPED | OUTPATIENT
Start: 2021-07-13 | End: 2021-09-15

## 2021-07-13 NOTE — PROGRESS NOTES
"Dede is a 21 year old who is being evaluated via a billable video visit.      How would you like to obtain your AVS? MyChart  If the video visit is dropped, the invitation should be resent by: Text to cell phone: [phone]  Will anyone else be joining your video visit? No      Video Start Time: 2:06 PM      -------------------------    Assessment/Plan:    Dede Robertson is a 21 year old female presenting for:    Dermatitis  Etiology somewhat unclear.  Potentially heat rash/sun rash/allergic dermatitis.  She will continue using Benadryl at night if needed.  Triamcinolone sent to the pharmacy discussed risk and benefits of medication.  She will contact me if there is anything else she needs.  - triamcinolone (KENALOG) 0.1 % external cream  Dispense: 45 g; Refill: 1        Medications Discontinued During This Encounter   Medication Reason     desogestrel-ethinyl estradiol (APRI) 0.15-0.03 mg per tablet      norethindrone (ORTHO MICRONOR) 0.35 mg tablet            Chief Complaint:  Derm Problem          Subjective:   Dede Robertson is a pleasant 21 year old female being evaluated via video visit today for the following concern/s:     Rash: Patient notes that about a week and a half ago she developed a rash.  She was outside most of the day and it was very ronal.  Later that day she noted that her upper chest was very red.  She then developed a itchy rash which was almost \"hive-like\" in appearance.  She [place] it did not appear on her legs and only her abdomen, back and chest.    She has been taking Benadryl and using aloe.  It is improving but she continues to have itching.  She is wondering if there is anything that she can put on this.  Otherwise, no new exposures that she is aware of.      12 point review of systems completed and negative except for what has been described above    History   Smoking Status     Never Smoker   Smokeless Tobacco     Never " Used         Current Outpatient Medications:      triamcinolone (KENALOG) 0.1 % external cream, Apply topically 2 times daily To body for rash, Disp: 45 g, Rfl: 1        Objective:  No vitals were done due to the nature of this visit  No flowsheet data found.            General: No acute distress  Psych: Appropriate affect  HEENT: moist mucous membranes  Pulmonary: Breathing comfortably, speaking in complete sentences  Extremities: warm and well perfused with no edema  Skin: warm and dry with a mild maculopapular rash on patient's back as well as under breasts bilaterally.     This note has been dictated and transcribed using voice recognition software.   Any errors in transcription are unintentional and inherent to the software.      Video-Visit Details    Type of service:  Video Visit    Video End Time:2:18 PM    Originating Location (pt. Location): Home    Distant Location (provider location):  [facility]     Platform used for Video Visit: [employer]

## 2021-07-16 ENCOUNTER — MYC MEDICAL ADVICE (OUTPATIENT)
Dept: FAMILY MEDICINE | Facility: CLINIC | Age: 21
End: 2021-07-16

## 2021-07-16 NOTE — TELEPHONE ENCOUNTER
Call placed to patient regarding mychart message    Patient states symptoms started Monday afternoon  Reporting generalized back and abdominal tightness   Rates discomfort a 4/10 on the pain scale  Discomfort is intermittent and feels like a hunger pain  Denies pain radiating  States pain is random and nothing specific sets off pain     Denies nausea / vomiting  Denies diarrhea  Denies fever  Denies urinary symptoms  Denies chest pain  Denies shortness of breath    Reporting a decreased appetite and reports adequate fluid intake  Reports feeling slightly bloated with onset of discomfort  + flatus   Reports an increase in stress over the last 6 months    Reports she has had these symptoms previously approx 2 years ago  Reports Dr. Giang recommended she take a probiotic and symptoms resolved with a month of being on the probiotic     Reports taking OTC Tums, Advil, and Pepto with minimal relief  States today symptoms have much improved and is only slightly symptomatic - restarted probiotic on Tuesday after symptoms onset     Reviewed red flag symptoms that would require ED evaluation  Appointment scheduled with Yair for 7/19/2021 at 1100    Patient verbalized understanding and agrees with plan  No further questions/concerns    Wang Owens RN

## 2021-07-19 ENCOUNTER — OFFICE VISIT (OUTPATIENT)
Dept: FAMILY MEDICINE | Facility: CLINIC | Age: 21
End: 2021-07-19
Payer: COMMERCIAL

## 2021-07-19 VITALS
DIASTOLIC BLOOD PRESSURE: 70 MMHG | BODY MASS INDEX: 19.4 KG/M2 | TEMPERATURE: 98.2 F | HEIGHT: 68 IN | WEIGHT: 128 LBS | SYSTOLIC BLOOD PRESSURE: 116 MMHG | OXYGEN SATURATION: 99 % | HEART RATE: 103 BPM

## 2021-07-19 DIAGNOSIS — K21.9 GASTROESOPHAGEAL REFLUX DISEASE WITHOUT ESOPHAGITIS: ICD-10-CM

## 2021-07-19 DIAGNOSIS — K29.00 ACUTE GASTRITIS WITHOUT HEMORRHAGE, UNSPECIFIED GASTRITIS TYPE: ICD-10-CM

## 2021-07-19 DIAGNOSIS — R14.0 ABDOMINAL BLOATING: ICD-10-CM

## 2021-07-19 DIAGNOSIS — R10.13 EPIGASTRIC PAIN: Primary | ICD-10-CM

## 2021-07-19 PROCEDURE — 99214 OFFICE O/P EST MOD 30 MIN: CPT | Performed by: PHYSICIAN ASSISTANT

## 2021-07-19 RX ORDER — OMEPRAZOLE 40 MG/1
40 CAPSULE, DELAYED RELEASE ORAL DAILY
Qty: 90 CAPSULE | Refills: 0 | Status: SHIPPED | OUTPATIENT
Start: 2021-07-19 | End: 2022-09-26

## 2021-07-19 ASSESSMENT — PAIN SCALES - GENERAL: PAINLEVEL: NO PAIN (0)

## 2021-07-19 ASSESSMENT — MIFFLIN-ST. JEOR: SCORE: 1379.66

## 2021-07-19 NOTE — PROGRESS NOTES
Assessment & Plan     ASSESSMENT/PLAN:      ICD-10-CM    1. Epigastric pain  R10.13    2. Acute gastritis without hemorrhage, unspecified gastritis type  K29.00 omeprazole (PRILOSEC) 40 MG DR capsule   3. Abdominal bloating  R14.0    4. Gastroesophageal reflux disease without esophagitis  K21.9      Symptoms have improved/near resolved.  Suspect symptoms related to stomach irritation/gastritis. Will trial prilosec short term to heal the lining. OTC antacids PRN.  Also having some gas pains, has noticed improvement with probiotics. Will continue as needed.    Patient Instructions   - pain could be related to colon/gas: continue probiotics at least the next few weeks and as needed  - also likely from stomach inflammation/gastritis: use the omeprazole 30 min before breakfast for 4 weeks or so then stop. Can use OTC tums or pepcid as needed.  Please let me know if further issues    30 minutes spent on the date of the encounter doing chart review, history and exam, documentation and further activities per the note    Return in about 2 weeks (around 8/2/2021) for if not improving or if worsening.    SCOT Galarza [facility] JOSE ALFREDO Aguayo is a 21 year old who presents for the following health issues     HPI     Abdominal/Flank Pain  Onset/Duration: 1 week   Description:   Character: Sharp and Dull ache off and on   Location: epigastric region  Radiation: Middle of back   Intensity: moderate  Progression of Symptoms:  improving  Accompanying Signs & Symptoms:  Fever/Chills: no  Gas/Bloating: YES- both   Nausea: no  Vomitting: no  Diarrhea: YES- for one day she did but it was after eating pizza Saturday   Constipation: no  Dysuria or Hematuria: no  History:   Trauma: no  Previous similar pain: YES- a couple of years ago   Previous tests done: Ultrasound in the past   Precipitating factors:   Does the pain change with:     Food: no    Bowel Movement: YES- improves after a bowel  "movement     Urination: no   Other factors:  no  Therapies tried and outcome: Tums, Advil and Pepto as well as probiotics   Patient's last menstrual period was 07/07/2021.   - just finished period last week, using condoms for contraception    Symptoms have been intermittent since 7/12/21  Symptoms have started to improve over the weekend, attributes it to restarting probiotics Tuesday ([facility])  Normal RUQ ultrasound 2017, visit for similar symptoms     Pain was whole abdomen, low back, and feflt like it seized up/felt tight more than \"pain\". This would be intermittent and would last minutes, this lasted 2-3 days. This then resolved, and then bilateral sides had sharp pain, remembers that being similar to a few years ago.  Some bloating feeling lower abdomen.    Had loss of appetite and was eating bland foods. She then had pizza two days ago and had diarrhea x2. Has some issues with dairy but not typically cheese. No BM yesterday, then normal one today. Previous to this she was having normal daily BMs.   A little heartburn at the beginning, resolved quickly.  She took tums the first two days (last Monday/Tuesday), helped her sleep.     Denies nausea / vomiting  Denies diarrhea otherwise  Denies fever  Denies urinary symptoms - No dysuria, frequency, urgency, no hematuria  Denies chest pain  Denies shortness of breath    Reports an increase in stress over the last 6 months - work stress  The week prior to symptom onset she was on a vacation in [place]. She feels pretty normal diet but did drink more coffee than usual      Review of Systems   Other than noted above, general, HEENT, respiratory, cardiac, MS, and gastrointestinal systems are negative.       Objective    /70   Pulse 103   Temp 98.2  F (36.8  C) (Tympanic)   Ht 1.72 m (5' 7.72\")   Wt 58.1 kg (128 lb)   LMP 07/07/2021   SpO2 99%   BMI 19.62 kg/m    Body mass index is 19.62 kg/m .  Physical Exam   GENERAL: healthy, alert and no " distress  NECK: no adenopathy, no asymmetry, masses, or scars and thyroid normal to palpation  RESP: lungs clear to auscultation - no rales, rhonchi or wheezes  CV: regular rate and rhythm, normal S1 S2, no S3 or S4, no murmur, click or rub, no peripheral edema and peripheral pulses strong  ABDOMEN: soft, POSITIVE mild epigastric tenderness, no rebound/guarding, martinez sign negative, no hepatosplenomegaly, no masses and bowel sounds normal  MS: no gross musculoskeletal defects noted, no edema  BACK: no CVA tenderness, no paralumbar tenderness  PSYCH: mentation appears normal, affect normal/bright

## 2021-07-19 NOTE — PATIENT INSTRUCTIONS
- pain could be related to colon/gas: continue probiotics at least the next few weeks and as needed  - also likely from stomach inflammation/gastritis: use the omeprazole 30 min before breakfast for 4 weeks or so then stop. Can use OTC tums or pepcid as needed.  Please let me know if further issues      Patient Education     Gastritis (Adult)    Gastritis is inflammation and irritation of the stomach lining. You can have it for a short time (acute) or it can be long lasting (chronic). Infection with bacteria called H. pylori most often causes gastritis. More than 1 out of 3 people in the [place] have these bacteria in their bodies. In many cases, H. pylori causes no problems or symptoms. But in some people, the infection irritates the stomach lining and causes gastritis. H. pylori may be diagnosed through blood, stool, or breath tests, or by a biopsy during an endoscopy. Other causes of stomach irritation include drinking alcohol, smoking or chewing tobacco, or taking pain-relieving medicines called nonsteroidal anti-inflammatory drugs (NSAIDs), such as aspirin or ibuprofen. Some illegal drugs (such as cocaine) and immune conditions can also cause gastritis.   Symptoms of gastritis can include:    Belly pain or bloating    Feeling full quickly    Loss of appetite    Nausea or vomiting    Vomiting blood or having black stools    Feeling more tired than normal  An inflamed and irritated stomach lining is more likely to develop a sore called an ulcer. To help prevent this, gastritis should be evaluated and treated as soon as symptoms occur.   Home care  If needed, your healthcare provider may prescribe medicines. If you have H. pylori infection, treating it will likely ease your symptoms. Other changes can help reduce stomach irritation and help it heal.     Take prescription medicines as directed. If you have been prescribed medicines for H. pylori infection, take them as directed. Take all of the medicine until it's  finished or until your provider tells you to stop taking it, even if you start to feel better.    Follow your healthcare provider's advice on NSAIDs. Your provider may advise you not to take NSAIDs. If you take daily aspirin for your heart or other health reasons, don't stop without talking with your provider first.    Don't drink alcohol. If you need help stopping your use of alcohol, ask your provider for treatment resources.    Stop smoking. Smoking can irritate the stomach and delay healing. As much as possible, stay away from secondhand smoke. If you smoke and have trouble stopping, ask your provider for help.  Follow-up care  Follow up with your healthcare provider, or as advised. You may need testing to check for inflammation or an ulcer.   When to get medical advice  Call your healthcare provider for any of the following:    Stomach pain that gets worse or moves to the lower right belly (appendix area)    Chest pain that suddenly appears or gets worse, or spreads to the back, neck, shoulder, or arm    Frequent vomiting (can t keep down liquids)    Blood in the stool or vomit (red or black in color)    Feeling weak or dizzy    Shortness of breath    Unexplained weight loss    Fever of 100.4 F (38 C) or higher, or as directed by your healthcare provider    Symptoms that get worse, or new symptoms  [employer] last reviewed this educational content on 2/1/2021 2000-2021 [employer]. All rights reserved. This information is not intended as a substitute for professional medical care. Always follow your healthcare professional's instructions.           Patient Education     GERD (Adult)    The esophagus is a tube that carries food from the mouth to the stomach. A valve (the LES, lower esophageal sphincter) at the lower end of the esophagus prevents stomach acid from flowing upward. When this valve doesn't work properly, stomach contents may repeatedly flow back up (reflux) into the esophagus. This is  "called gastroesophageal reflux disease (GERD). GERD can irritate the esophagus. It can cause problems with pain, swallowing or breathing. In severe cases, GERD can cause recurrent pneumonia (from aspiration or breathing in particles) or other serious problems.  Symptoms of reflux include burning, pressure or sharp pain in the upper abdomen or mid to lower chest. The pain can spread to the neck, back, or shoulder. There may be belching, an acid taste in the back of the throat, chronic cough, or sore throat, or hoarseness. GERD symptoms often occur during the day after a big meal. They can also occur at night when lying down.   Home care  Lifestyle changes can help reduce symptoms. If needed, your healthcare provider may prescribe medicines. Symptoms often improve with treatment, but if treatment is stopped, the symptoms often return after a few months. So most persons with GERD will need to continue treatment or get treatment on and off.  Lifestyle changes    Limit or avoid fatty, fried, and spicy foods, as well as coffee, chocolate, mint, and foods with high acid content such as tomatoes and citrus fruit and juices (orange, grapefruit, lemon).    Don t eat large meals, especially at night. Frequent, smaller meals are best. Don't lie down right after eating. And don t eat anything 3 hours before going to bed.    Don't drink alcohol or smoke. As much as possible, stay away from second hand smoke.    If you are overweight, losing weight will reduce symptoms.     Don't wear tight clothing around your stomach area.    If your symptoms occur during sleep, use a foam wedge to elevate your upper body (not just your head.) Or, place 4\" blocks under the head of your bed. Or use 2 bed risers under your bedframe.  Medicines  If needed, medicines can help relieve the symptoms of GERD and prevent damage to the esophagus. Discuss a medicine plan with your healthcare provider. This may include one or more of the following " medicines:    Antacids to help neutralize the normal acids in your stomach.    Acid blockers (Histamine or H2 blockers) to decrease acid production.    Acid inhibitors (proton pump inhibitors PPIs) to decrease acid production in a different way than the blockers. They may work better, but can take a little longer to take effect.  Take an antacid 30 to 60 minutes after eating and at bedtime, but not at the same time as an acid blocker.  Try not to take medicines such as ibuprofen and aspirin. If you are taking aspirin for your heart or other medical reasons, talk to your healthcare provider about stopping it.  Follow-up care  Follow up with your healthcare provider or as advised by our staff.  When to seek medical advice  Call your healthcare provider if any of the following occur:    Stomach pain gets worse or moves to the lower right abdomen (appendix area)    Chest pain appears or gets worse, or spreads to the back, neck, shoulder, or arm    An over-the-counter trial of medicine doesn't relieve your symptoms    Weight loss that can't be explained    Trouble or pain swallowing    Frequent vomiting (can t keep down liquids)    Blood in the stool or vomit (red or black in color)    Feeling weak or dizzy    Fever of 100.4 F (38 C) or higher, or as directed by your healthcare provider  [employer] last reviewed this educational content on 3/1/2018    6908-9616 [employer]. All rights reserved. This information is not intended as a substitute for professional medical care. Always follow your healthcare professional's instructions.

## 2021-07-26 ENCOUNTER — MYC MEDICAL ADVICE (OUTPATIENT)
Dept: FAMILY MEDICINE | Facility: CLINIC | Age: 21
End: 2021-07-26

## 2021-08-21 ENCOUNTER — HEALTH MAINTENANCE LETTER (OUTPATIENT)
Age: 21
End: 2021-08-21

## 2021-09-12 ASSESSMENT — ENCOUNTER SYMPTOMS
ARTHRALGIAS: 0
EYE PAIN: 0
HEMATURIA: 0
CONSTIPATION: 0
NERVOUS/ANXIOUS: 0
MYALGIAS: 0
NAUSEA: 0
DYSURIA: 0
WEAKNESS: 0
SHORTNESS OF BREATH: 0
FEVER: 0
DIARRHEA: 0
HEADACHES: 0
HEARTBURN: 0
BREAST MASS: 0
DIZZINESS: 0
HEMATOCHEZIA: 0
FREQUENCY: 0
PARESTHESIAS: 0
PALPITATIONS: 0
COUGH: 0
CHILLS: 0
SORE THROAT: 0
JOINT SWELLING: 0
ABDOMINAL PAIN: 0

## 2021-09-17 ENCOUNTER — OFFICE VISIT (OUTPATIENT)
Dept: FAMILY MEDICINE | Facility: CLINIC | Age: 21
End: 2021-09-17
Payer: COMMERCIAL

## 2021-09-17 VITALS
HEIGHT: 68 IN | SYSTOLIC BLOOD PRESSURE: 98 MMHG | RESPIRATION RATE: 16 BRPM | WEIGHT: 125.38 LBS | DIASTOLIC BLOOD PRESSURE: 62 MMHG | HEART RATE: 76 BPM | TEMPERATURE: 98.5 F | BODY MASS INDEX: 19 KG/M2

## 2021-09-17 DIAGNOSIS — Z00.00 HEALTHCARE MAINTENANCE: Primary | ICD-10-CM

## 2021-09-17 LAB
HBA1C MFR BLD: 5 % (ref 0–5.6)
HGB BLD-MCNC: 14.4 G/DL (ref 11.7–15.7)

## 2021-09-17 PROCEDURE — 90471 IMMUNIZATION ADMIN: CPT | Performed by: FAMILY MEDICINE

## 2021-09-17 PROCEDURE — 36415 COLL VENOUS BLD VENIPUNCTURE: CPT | Performed by: FAMILY MEDICINE

## 2021-09-17 PROCEDURE — 85018 HEMOGLOBIN: CPT | Performed by: FAMILY MEDICINE

## 2021-09-17 PROCEDURE — 90686 IIV4 VACC NO PRSV 0.5 ML IM: CPT | Performed by: FAMILY MEDICINE

## 2021-09-17 PROCEDURE — 83036 HEMOGLOBIN GLYCOSYLATED A1C: CPT | Performed by: FAMILY MEDICINE

## 2021-09-17 PROCEDURE — 99395 PREV VISIT EST AGE 18-39: CPT | Mod: 25 | Performed by: FAMILY MEDICINE

## 2021-09-17 RX ORDER — NORGESTIMATE AND ETHINYL ESTRADIOL 7DAYSX3 LO
1 KIT ORAL DAILY
Qty: 84 TABLET | Refills: 3 | Status: SHIPPED | OUTPATIENT
Start: 2021-09-17 | End: 2022-08-08

## 2021-09-17 ASSESSMENT — MIFFLIN-ST. JEOR: SCORE: 1376.17

## 2021-09-17 NOTE — PROGRESS NOTES
Answers for HPI/ROS submitted by the patient on 9/12/2021  Frequency of exercise:: 2-3 days/week  Getting at least 3 servings of Calcium per day:: Yes  Diet:: Other  Taking medications regularly:: Not Applicable  Medication side effects:: Not applicable  Bi-annual eye exam:: NO  Dental care twice a year:: Yes  Sleep apnea or symptoms of sleep apnea:: None  abdominal pain: No  Blood in stool: No  Blood in urine: No  chest pain: No  chills: No  congestion: No  constipation: No  cough: No  diarrhea: No  dizziness: No  ear pain: No  eye pain: No  nervous/anxious: No  fever: No  frequency: No  genital sores: No  headaches: No  hearing loss: No  heartburn: No  arthralgias: No  joint swelling: No  peripheral edema: No  mood changes: No  myalgias: No  nausea: No  dysuria: No  palpitations: No  Skin sensation changes: No  sore throat: No  urgency: No  rash: No  shortness of breath: No  visual disturbance: No  weakness: No  pelvic pain: No  vaginal bleeding: No  vaginal discharge: No  tenderness: No  breast mass: No  breast discharge: No  Additional concerns today:: No  Duration of exercise:: 30-45 minutes        Assessment/Plan:     Health maintenance female exam.  All questions answered.  Breast self exam technique reviewed and patient encouraged to perform self-exam monthly.  Discussed healthy lifestyle modifications.  Await fasting lab results      Healthcare maintenance  We will start her on Ortho Tri-Cyclen Lo.  Seems like she did not tolerate several of the birth controls for various reasons.    If she has issues with Ortho Tri-Cyclen Lo would maybe start her back on Erlinda as she tolerated this the best in the past.  - norgestim-eth estrad triphasic (ORTHO TRI-CYCLEN LO) 0.18/0.215/0.25 MG-25 MCG tablet  Dispense: 84 tablet; Refill: 3  - Hemoglobin  - Hemoglobin A1c  - INFLUENZA VACCINE IM >6 MO VALENT IIV4 (ALFURIA/FLUZONE)  - Hemoglobin  - Hemoglobin A1c        Patient has been advised of split billing requirements and  indicates understanding: Yes      Subjective:     Dede Robertson is a 21 year old female who presents for an annual exam.  She has unfortunately been very stressed at work.  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    She notes that her menstrual cycles have been fairly irregular.  She thinks that she might want to get back on birth control.  She does not want to be pregnant.  Her and her boyfriend are sexually active and do not always use condoms.      Healthy Habits:   Regular Exercise: Yes  Sunscreen Use: Yes  Healthy Diet: yes  Dental Visits Regularly: yes  Seat Belt: Yes  Self Breast Exam Monthly: sometimes      Immunization History   Administered Date(s) Administered     COVID-19,PF,Pfizer 04/02/2021, 04/23/2021     Comvax (HIB/HepB) 03/22/1999     DTAP (<7y) 1998, 1998, 1998, 06/23/1999, 05/23/2003, 05/27/2008     DTaP, Unspecified 1998, 1998, 06/23/1999, 05/23/2003, 05/27/2008     FLU 6-35 months 12/15/2006     Flu, Unspecified 11/26/2003, 12/15/2006, 08/24/2009     Flu-nasal, Unspecified 12/26/2003, 11/27/2007     HPV Quadrivalent 07/31/2013, 10/11/2013, 04/10/2014     Hep B, Peds or Adolescent 1998, 1998     HepB, Unspecified 1998, 03/22/1999, 04/06/2008     HepB-Adult 04/06/2008     Hib (PRP-T) 1998, 1998, 03/22/1999, 05/27/2008     Historic Hib Hib-titer 1998, 1998, 1998     Influenza (IIV3) PF 11/26/2003, 12/26/2003, 11/10/2005, 11/27/2007, 10/19/2012, 11/26/2013, 12/01/2014     Influenza Intranasal Vaccine 11/24/2008, 08/24/2009     Influenza Vaccine IM > 6 months Valent IIV4 (Alfuria,Fluzone) 10/31/2018, 10/06/2020, 09/17/2021     Influenza Vaccine, 6+MO IM (QUADRIVALENT W/PRESERVATIVES) 11/10/2005, 11/24/2008, 10/19/2012, 10/19/2012, 11/26/2013, 12/01/2014, 11/18/2016, 10/11/2017, 09/17/2019     MMR 06/23/1999, 04/03/2001, 06/23/2008     Meningococcal (Bexsero ) 06/02/2016,  07/20/2016     Meningococcal (Menactra ) 08/24/2009, 06/02/2016     OPV, trivalent, live 06/23/1999     Poliovirus, inactivated (IPV) 1998, 1998, 06/23/1999, 05/23/2003     Tdap (Adacel,Boostrix) 08/24/2009, 07/17/2019     Varicella 03/22/1999, 08/24/2009         Gynecologic History  Patient's last menstrual period was 09/06/2021 (exact date).  Contraception: None -- is she planning pregnancy? no  Last Pap: 2019. Results were: normal      OB History   No obstetric history on file.       Current Outpatient Medications   Medication Sig Dispense Refill     norgestim-eth estrad triphasic (ORTHO TRI-CYCLEN LO) 0.18/0.215/0.25 MG-25 MCG tablet Take 1 tablet by mouth daily 84 tablet 3     omeprazole (PRILOSEC) 40 MG DR capsule Take 1 capsule (40 mg) by mouth daily 90 capsule 0     No past medical history on file.  Past Surgical History:   Procedure Laterality Date     WISDOM TOOTH EXTRACTION       Amoxicillin  Family History   Problem Relation Age of Onset     No Known Problems Mother      No Known Problems Father      Cancer Paternal Grandmother      Diabetes Paternal Grandfather      Heart Disease No family hx of      Social History     Socioeconomic History     Marital status: Single     Spouse name: Not on file     Number of children: Not on file     Years of education: Not on file     Highest education level: Not on file   Occupational History     Not on file   Tobacco Use     Smoking status: Never Smoker     Smokeless tobacco: Never Used   Substance and Sexual Activity     Alcohol use: No     Drug use: No     Sexual activity: Never   Other Topics Concern     Not on file   Social History Narrative    Lives with mother (Marybeth) and father (Kiko). Older brother.     Social Determinants of Health     Financial Resource Strain:      Difficulty of Paying Living Expenses:    Food Insecurity:      Worried About Running Out of Food in the Last Year:      Ran Out of Food in the Last Year:    Transportation Needs:   "    Lack of Transportation (Medical):      Lack of Transportation (Non-Medical):    Physical Activity:      Days of Exercise per Week:      Minutes of Exercise per Session:    Stress:      Feeling of Stress :    Social Connections:      Frequency of Communication with Friends and Family:      Frequency of Social Gatherings with Friends and Family:      Attends Yaz[employer] Services:      Active Member of Clubs or Organizations:      Attends Club or Organization Meetings:      Marital Status:    Intimate Partner Violence:      Fear of Current or Ex-Partner:      Emotionally Abused:      Physically Abused:      Sexually Abused:        Review of Systems  12 point review of systems was completed and found to be negative except for what is been stated above.      Objective:      Vitals:    09/17/21 1009   BP: 98/62   Pulse: 76   Resp: 16   Temp: 98.5  F (36.9  C)   TempSrc: Tympanic   Weight: 56.9 kg (125 lb 6 oz)   Height: 1.734 m (5' 8.25\")         Physical Exam:  General Appearance: Alert, cooperative, no distress, appears stated age   Head: Normocephalic, without obvious abnormality, atraumatic  Eyes: PERRL, conjunctiva/corneas clear, EOM's intact   Ears: Normal TM's and external ear canals, both ears  Neck: Supple, symmetrical, trachea midline, no adenopathy;  thyroid: not enlarged, symmetric, no tenderness/mass/nodules  Back: Symmetric, no curvature, ROM normal,  Lungs: Clear to auscultation bilaterally, respirations unlabored  Breasts: No breast masses, tenderness, asymmetry, or nipple discharge.  Heart: Regular rate and rhythm, S1 and S2 normal, no murmur, rub, or gallop  Abdomen: Soft, non-tender, bowel sounds active all four quadrants,  no masses, no organomegaly  Extremities: Extremities normal, atraumatic, no cyanosis or edema  Skin: Skin color, texture, turgor normal, no rashes or lesions  Lymph nodes: Cervical, supraclavicular, and axillary nodes normal and   Neurologic: Normal     "

## 2021-11-22 ENCOUNTER — E-VISIT (OUTPATIENT)
Dept: FAMILY MEDICINE | Facility: CLINIC | Age: 21
End: 2021-11-22
Payer: COMMERCIAL

## 2021-11-22 DIAGNOSIS — J01.90 ACUTE BACTERIAL SINUSITIS: Primary | ICD-10-CM

## 2021-11-22 DIAGNOSIS — B96.89 ACUTE BACTERIAL SINUSITIS: Primary | ICD-10-CM

## 2021-11-22 PROCEDURE — 99422 OL DIG E/M SVC 11-20 MIN: CPT | Performed by: FAMILY MEDICINE

## 2021-11-23 RX ORDER — DOXYCYCLINE HYCLATE 100 MG
100 TABLET ORAL 2 TIMES DAILY
Qty: 14 TABLET | Refills: 0 | Status: SHIPPED | OUTPATIENT
Start: 2021-11-23 | End: 2021-11-30

## 2021-11-23 NOTE — PATIENT INSTRUCTIONS
Sinusitis (Antibiotic Treatment)    The sinuses are air-filled spaces within the bones of the face. They connect to the inside of the nose. Sinusitis is an inflammation of the tissue that lines the sinuses. Sinusitis can occur during a cold. It can also happen due to allergies to pollens and other particles in the air. Sinusitis can cause symptoms of sinus congestion and a feeling of fullness. A sinus infection causes fever, headache, and facial pain. There is often green or yellow fluid draining from the nose or into the back of the throat (post-nasal drip). You have been given antibiotics to treat this condition.   Home care    Take the full course of antibiotics as instructed. Don't stop taking them, even when you feel better.    Drink plenty of water, hot tea, and other liquids as directed by the healthcare provider. This may help thin nasal mucus. It also may help your sinuses drain fluids.    Heat may help soothe painful areas of your face. Use a towel soaked in hot water. Or,  the shower and direct the warm spray onto your face. Using a vaporizer along with a menthol rub at night may also help soothe symptoms.     An expectorant with guaifenesin may help thin nasal mucus and help your sinuses drain fluids. Talk with your provider or pharmacists before taking an over-the-counter (OTC) medicine if you have any questions about it or its side effects..    You can use an OTC decongestant, unless a similar medicine was prescribed to you. Nasal sprays work the fastest. Use one that contains phenylephrine or oxymetazoline. First blow your nose gently. Then use the spray. Don't use these medicines more often than directed on the label. If you do, your symptoms may get worse. You may also take pills that contain pseudoephedrine. Don t use products that combine multiple medicines. This is because side effects may be increased. Read labels. You can also ask the pharmacist for help. (People with high blood  pressure should not use decongestants. They can raise blood pressure.) Talk with your provider or pharmacist if you have any questions about the medicine..    OTC antihistamines may help if allergies contributed to your sinusitis. Talk with your provider or pharmacist if you have any questions about the medicine..    Don't use nasal rinses or irrigation during an acute sinus infection, unless your healthcare provider tells you to. Rinsing may spread the infection to other areas in your sinuses.    Use acetaminophen or ibuprofen to control pain, unless another pain medicine was prescribed to you. If you have chronic liver or kidney disease or ever had a stomach ulcer, talk with your healthcare provider before using these medicines. Never give aspirin to anyone under age 18 who is ill with a fever. It may cause severe liver damage.    Don't smoke. This can make symptoms worse.    Follow-up care  Follow up with your healthcare provider, or as advised.   When to seek medical advice  Call your healthcare provider if any of these occur:     Facial pain or headache that gets worse    Stiff neck    Unusual drowsiness or confusion    Swelling of your forehead or eyelids    Symptoms don't go away in 10 days    Vision problems, such as blurred or double vision    Fever of 100.4 F (38 C) or higher, or as directed by your healthcare provider  Call 911  Call 911 if any of these occur:     Seizure    Trouble breathing    Feeling dizzy or faint    Fingernails, skin or lips look blue, purple , or gray  Prevention  Here are steps you can take to help prevent an infection:     Keep good hand washing habits.    Don t have close contact with people who have sore throats, colds, or other upper respiratory infections.    Don t smoke, and stay away from secondhand smoke.    Stay up to date with of your vaccines.  [employer] last reviewed this educational content on 12/1/2019 2000-2021 [employer]. All rights reserved. This  information is not intended as a substitute for professional medical care. Always follow your healthcare professional's instructions.        Dear Dede Robertson    After reviewing your responses, I've been able to diagnose you with?a sinus infection caused by bacteria.?     Based on your responses and diagnosis, I have prescribed Doxycycline to treat your symptoms. I have sent this to your pharmacy.? You should NOT DRINK ALCOHOL WHILE ON THIS MEDICATION.    It is also important to stay well hydrated, get lots of rest and take over-the-counter decongestants,?tylenol?or ibuprofen if you?are able to?take those medications per your primary care provider to help relieve discomfort.?     It is important that you take?all of?your prescribed medication even if your symptoms are improving after a few doses.? Taking?all of?your medicine helps prevent the symptoms from returning.?     If your symptoms worsen, you develop severe headache, vomiting, high fever (>102), or are not improving in 7 days, please contact your primary care provider for an appointment or visit any of our convenient Walk-in Care or Urgent Care Centers to be seen which can be found on our website?here.?     Thanks again for choosing?us?as your health care partner,?   ?  Medina Giang MD?

## 2022-09-20 ASSESSMENT — ENCOUNTER SYMPTOMS
HEMATOCHEZIA: 0
PARESTHESIAS: 0
ABDOMINAL PAIN: 0
MYALGIAS: 0
JOINT SWELLING: 0
FEVER: 0
HEMATURIA: 0
NAUSEA: 0
ARTHRALGIAS: 0
FREQUENCY: 0
CONSTIPATION: 0
HEADACHES: 0
SORE THROAT: 0
BREAST MASS: 0
PALPITATIONS: 0
DYSURIA: 0
HEARTBURN: 0
NERVOUS/ANXIOUS: 0
DIARRHEA: 0
SHORTNESS OF BREATH: 0
WEAKNESS: 0
DIZZINESS: 0
COUGH: 0
CHILLS: 0
EYE PAIN: 0

## 2022-09-27 ENCOUNTER — ANCILLARY PROCEDURE (OUTPATIENT)
Dept: GENERAL RADIOLOGY | Facility: CLINIC | Age: 22
End: 2022-09-27
Attending: FAMILY MEDICINE
Payer: COMMERCIAL

## 2022-09-27 ENCOUNTER — OFFICE VISIT (OUTPATIENT)
Dept: FAMILY MEDICINE | Facility: CLINIC | Age: 22
End: 2022-09-27

## 2022-09-27 VITALS
DIASTOLIC BLOOD PRESSURE: 68 MMHG | RESPIRATION RATE: 16 BRPM | WEIGHT: 138 LBS | SYSTOLIC BLOOD PRESSURE: 110 MMHG | HEART RATE: 87 BPM | HEIGHT: 68 IN | TEMPERATURE: 98.6 F | OXYGEN SATURATION: 99 % | BODY MASS INDEX: 20.92 KG/M2

## 2022-09-27 DIAGNOSIS — Z11.3 SCREEN FOR STD (SEXUALLY TRANSMITTED DISEASE): ICD-10-CM

## 2022-09-27 DIAGNOSIS — R10.84 ABDOMINAL PAIN, GENERALIZED: ICD-10-CM

## 2022-09-27 DIAGNOSIS — Z12.4 CERVICAL CANCER SCREENING: ICD-10-CM

## 2022-09-27 DIAGNOSIS — Z00.00 HEALTHCARE MAINTENANCE: Primary | ICD-10-CM

## 2022-09-27 PROCEDURE — 87491 CHLMYD TRACH DNA AMP PROBE: CPT | Performed by: FAMILY MEDICINE

## 2022-09-27 PROCEDURE — 87591 N.GONORRHOEAE DNA AMP PROB: CPT | Performed by: FAMILY MEDICINE

## 2022-09-27 PROCEDURE — G0124 SCREEN C/V THIN LAYER BY MD: HCPCS | Performed by: PATHOLOGY

## 2022-09-27 PROCEDURE — 74019 RADEX ABDOMEN 2 VIEWS: CPT | Mod: TC | Performed by: RADIOLOGY

## 2022-09-27 PROCEDURE — 90686 IIV4 VACC NO PRSV 0.5 ML IM: CPT | Performed by: FAMILY MEDICINE

## 2022-09-27 PROCEDURE — 90471 IMMUNIZATION ADMIN: CPT | Performed by: FAMILY MEDICINE

## 2022-09-27 PROCEDURE — G0145 SCR C/V CYTO,THINLAYER,RESCR: HCPCS | Performed by: FAMILY MEDICINE

## 2022-09-27 PROCEDURE — 99213 OFFICE O/P EST LOW 20 MIN: CPT | Mod: 25 | Performed by: FAMILY MEDICINE

## 2022-09-27 PROCEDURE — 99395 PREV VISIT EST AGE 18-39: CPT | Mod: 25 | Performed by: FAMILY MEDICINE

## 2022-09-27 RX ORDER — NORGESTIMATE AND ETHINYL ESTRADIOL 7DAYSX3 LO
1 KIT ORAL DAILY
Qty: 84 TABLET | Refills: 4 | Status: SHIPPED | OUTPATIENT
Start: 2022-09-27 | End: 2023-09-25

## 2022-09-27 NOTE — PROGRESS NOTES
Answers for HPI/ROS submitted by the patient on 9/20/2022  Frequency of exercise:: 2-3 days/week  Getting at least 3 servings of Calcium per day:: Yes  Diet:: Other  Taking medications regularly:: Yes  Medication side effects:: None  Bi-annual eye exam:: NO  Dental care twice a year:: Yes  Sleep apnea or symptoms of sleep apnea:: None  abdominal pain: No  Blood in stool: No  Blood in urine: No  chest pain: No  chills: No  congestion: No  constipation: No  cough: No  diarrhea: No  dizziness: No  ear pain: No  eye pain: No  nervous/anxious: No  fever: No  frequency: No  genital sores: No  headaches: No  hearing loss: No  heartburn: No  arthralgias: No  joint swelling: No  peripheral edema: No  mood changes: No  myalgias: No  nausea: No  dysuria: No  palpitations: No  Skin sensation changes: No  sore throat: No  urgency: No  rash: No  shortness of breath: No  visual disturbance: No  weakness: No  pelvic pain: No  vaginal bleeding: No  vaginal discharge: No  tenderness: No  breast mass: No  breast discharge: No  Additional concerns today:: No  Duration of exercise:: 15-30 minutes        Assessment/Plan:     Health maintenance female exam.  All questions answered.  Await pap smear results.  Breast self exam technique reviewed and patient encouraged to perform self-exam monthly.  Discussed healthy lifestyle modifications.      Healthcare maintenance  refill  - norgestim-eth estrad triphasic (ORTHO TRI-CYCLEN LO) 0.18/0.215/0.25 MG-25 MCG tablet  Dispense: 84 tablet; Refill: 4    Cervical cancer screening  - Pap Screen only - recommended age 21 - 24 years    Screen for STD (sexually transmitted disease)  - NEISSERIA GONORRHOEA PCR  - CHLAMYDIA TRACHOMATIS PCR    Abdominal pain, generalized  I personally reviewed her abdominal x-ray.  This shows a moderate amount of stool in her colon.  Recommended using MiraLAX once or twice daily for the next 1 to 2 weeks as a cleanout.  Hopefully this will help with her abdominal pain.   If it does not we could consider having her see GI.  She also seems to do well with a probiotic and she could continue taking that as well.  I do think that cleaning out some of the stool would be beneficial.  - XR Abdomen 2 Views        Patient has been advised of split billing requirements and indicates understanding: Yes      Subjective:     Dede Robertson is a 22 year old female who presents for an annual exam.      She is overall doing well.  She works in [place].  She has a long-term boyfriend.    She has been experiencing fairly chronic abdominal pain.  She states that after she eats she will get cramping pain in her lower abdomen.  This does not really matter what she is eating.  She did try probiotic which seemed to help but when she stopped the probiotic the pain returned.  She states that she has been having normal bowel movements.        Healthy Habits:   Regular Exercise: Yes  Sunscreen Use: Yes  Healthy Diet: yes  Dental Visits Regularly: yes  Seat Belt: Yes  Self Breast Exam Monthly: yes  Prevention of Osteoporosis: yes    Immunization History   Administered Date(s) Administered     COVID-19,PF,Pfizer (12+ Yrs) 04/02/2021, 04/23/2021, 10/23/2021     COVID-19,PF,Pfizer 12+ YRS BIVALENT Booster 09/18/2022     Comvax (HIB/HepB) 03/22/1999     DTAP (<7y) 1998, 1998, 1998, 06/23/1999, 05/23/2003, 05/27/2008     DTaP, Unspecified 1998, 1998, 06/23/1999, 05/23/2003, 05/27/2008     FLU 6-35 months 12/15/2006     Flu, Unspecified 11/26/2003, 12/15/2006, 08/24/2009     Flu-nasal, Unspecified 12/26/2003, 11/27/2007     HPV Quadrivalent 07/31/2013, 10/11/2013, 04/10/2014     Hep B, Peds or Adolescent 1998, 1998     HepB, Unspecified 1998, 03/22/1999, 04/06/2008     HepB-Adult 04/06/2008     Hib (PRP-T) 1998, 1998, 03/22/1999, 05/27/2008     Historic Hib Hib-titer 1998, 1998, 1998     Influenza (IIV3) PF 11/26/2003, 12/26/2003,  11/10/2005, 11/27/2007, 10/19/2012, 11/26/2013, 12/01/2014     Influenza Intranasal Vaccine 11/24/2008, 08/24/2009     Influenza Vaccine IM > 6 months Valent IIV4 (Alfuria,Fluzone) 10/31/2018, 10/06/2020, 09/17/2021, 09/27/2022     Influenza Vaccine, 6+MO IM (QUADRIVALENT W/PRESERVATIVES) 11/10/2005, 11/24/2008, 10/19/2012, 10/19/2012, 11/26/2013, 12/01/2014, 11/18/2016, 10/11/2017, 09/17/2019     MMR 06/23/1999, 04/03/2001, 06/23/2008     Meningococcal (Bexsero ) 06/02/2016, 07/20/2016     Meningococcal (Menactra ) 08/24/2009, 06/02/2016     OPV, trivalent, live 06/23/1999     Poliovirus, inactivated (IPV) 1998, 1998, 06/23/1999, 05/23/2003     Tdap (Adacel,Boostrix) 08/24/2009, 07/17/2019     Varicella 03/22/1999, 08/24/2009         Gynecologic History  Patient's last menstrual period was 09/06/2022 (exact date).  Contraception: oral contraceptive  Last Pap: 2019. Results were: normal      OB History   No obstetric history on file.       Current Outpatient Medications   Medication Sig Dispense Refill     norgestim-eth estrad triphasic (ORTHO TRI-CYCLEN LO) 0.18/0.215/0.25 MG-25 MCG tablet Take 1 tablet by mouth daily Skip placebo pills. 84 tablet 4     No past medical history on file.  Past Surgical History:   Procedure Laterality Date     WISDOM TOOTH EXTRACTION       Amoxicillin  Family History   Problem Relation Age of Onset     No Known Problems Mother      No Known Problems Father      Cancer Paternal Grandmother      Diabetes Paternal Grandfather      Heart Disease No family hx of      Social History     Socioeconomic History     Marital status: Single     Spouse name: Not on file     Number of children: Not on file     Years of education: Not on file     Highest education level: Not on file   Occupational History     Not on file   Tobacco Use     Smoking status: Never Smoker     Smokeless tobacco: Never Used   Substance and Sexual Activity     Alcohol use: No     Drug use: No     Sexual  "activity: Never   Other Topics Concern     Not on file   Social History Narrative    Lives with mother (Marybeth) and father (Kiko). Older brother.     Social Determinants of Health     Financial Resource Strain: Not on file   Food Insecurity: Not on file   Transportation Needs: Not on file   Physical Activity: Not on file   Stress: Not on file   Social Connections: Not on file   Intimate Partner Violence: Not on file   Housing Stability: Not on file       Review of Systems  12 point review of systems was completed and found to be negative except for what is been stated above.      Objective:      Vitals:    09/27/22 1023   BP: 110/68   Pulse: 87   Resp: 16   Temp: 98.6  F (37  C)   TempSrc: Tympanic   SpO2: 99%   Weight: 62.6 kg (138 lb)   Height: 1.721 m (5' 7.75\")         Physical Exam:  General Appearance: Alert, cooperative, no distress, appears stated age   Head: Normocephalic, without obvious abnormality, atraumatic  Eyes: PERRL, conjunctiva/corneas clear, EOM's intact   Ears: Normal TM's and external ear canals, both ears   Neck: Supple, symmetrical, trachea midline, no adenopathy;  thyroid: not enlarged, symmetric, no tenderness/mass/nodules  Back: Symmetric, no curvature, ROM normal,  Lungs: Clear to auscultation bilaterally, respirations unlabored  Breasts: No breast masses, tenderness, asymmetry, or nipple discharge.  Heart: Regular rate and rhythm, S1 and S2 normal, no murmur, rub, or gallop  Abdomen: Soft, non-tender, bowel sounds active all four quadrants,  no masses, no organomegaly  Pelvic:normal external female genitalia, normal appearing vaginal mucosa and cervix  Extremities: Extremities normal, atraumatic, no cyanosis or edema  Skin: Skin color, texture, turgor normal, no rashes or lesions  Lymph nodes: Cervical, supraclavicular, and axillary nodes normal and   Neurologic: Normal     "

## 2022-09-28 LAB
C TRACH DNA SPEC QL NAA+PROBE: NEGATIVE
N GONORRHOEA DNA SPEC QL NAA+PROBE: NEGATIVE

## 2022-09-30 LAB
BKR LAB AP GYN ADEQUACY: ABNORMAL
BKR LAB AP GYN INTERPRETATION: ABNORMAL
BKR LAB AP HPV REFLEX: NO
BKR LAB AP PREVIOUS ABNORMAL: ABNORMAL
PATH REPORT.COMMENTS IMP SPEC: ABNORMAL
PATH REPORT.COMMENTS IMP SPEC: ABNORMAL
PATH REPORT.RELEVANT HX SPEC: ABNORMAL

## 2022-10-03 ENCOUNTER — PATIENT OUTREACH (OUTPATIENT)
Dept: FAMILY MEDICINE | Facility: CLINIC | Age: 22
End: 2022-10-03

## 2022-10-23 ENCOUNTER — MYC MEDICAL ADVICE (OUTPATIENT)
Dept: FAMILY MEDICINE | Facility: CLINIC | Age: 22
End: 2022-10-23

## 2022-10-23 ENCOUNTER — NURSE TRIAGE (OUTPATIENT)
Dept: NURSING | Facility: CLINIC | Age: 22
End: 2022-10-23

## 2022-10-24 ENCOUNTER — OFFICE VISIT (OUTPATIENT)
Dept: FAMILY MEDICINE | Facility: CLINIC | Age: 22
End: 2022-10-24
Payer: COMMERCIAL

## 2022-10-24 VITALS
OXYGEN SATURATION: 99 % | TEMPERATURE: 97.8 F | BODY MASS INDEX: 20.76 KG/M2 | HEART RATE: 101 BPM | DIASTOLIC BLOOD PRESSURE: 85 MMHG | RESPIRATION RATE: 16 BRPM | WEIGHT: 137 LBS | HEIGHT: 68 IN | SYSTOLIC BLOOD PRESSURE: 135 MMHG

## 2022-10-24 DIAGNOSIS — H69.91 EUSTACHIAN TUBE DYSFUNCTION, RIGHT: Primary | ICD-10-CM

## 2022-10-24 PROCEDURE — 99213 OFFICE O/P EST LOW 20 MIN: CPT | Performed by: FAMILY MEDICINE

## 2022-10-24 RX ORDER — DOXYCYCLINE 100 MG/1
100 CAPSULE ORAL 2 TIMES DAILY
Qty: 14 CAPSULE | Refills: 0 | Status: SHIPPED | OUTPATIENT
Start: 2022-10-31 | End: 2022-11-21

## 2022-10-24 ASSESSMENT — ENCOUNTER SYMPTOMS: FEVER: 0

## 2022-10-24 NOTE — PROGRESS NOTES
"  Assessment & Plan     Eustachian tube dysfunction, right  Acute problem, recommend over the counter intranasal Flonase.  If symptoms refractory after another week, written prescription for doxycycline given to patient, history of amoxicillin allergy.  Consider ENT referral in future.  - doxycycline hyclate (VIBRAMYCIN) 100 MG capsule  Dispense: 14 capsule; Refill: 0        Return in about 1 week (around 10/31/2022) for If symptoms do not improve or gets worse..    Eliot Ragland MD  [facility]    Jona Aguayo is a 22 year old, presenting for the following health issues:  Ear Problem (Right ear pain since this AM)      History of Present Illness       Reason for visit:  Feeling light headed and ear / right head pressure  Symptom onset:  1-3 days ago  Symptoms include:  Felt light headed last night, better today. Woke up with right ear pressure and head pressure  Symptom intensity:  Moderate  Symptom progression:  Improving  Had these symptoms before:  No  What makes it worse:  Turning my head or looking down makes it worse  What makes it better:  Laying down, drinking water    She eats 2-3 servings of fruits and vegetables daily.She consumes 1 sweetened beverage(s) daily.She exercises with enough effort to increase her heart rate 20 to 29 minutes per day.  She exercises with enough effort to increase her heart rate 4 days per week.   She is taking medications regularly.     Patient is a pleasant 22-year-old female who presents to the clinic with right ear pain.    Review of Systems   Constitutional: Negative for fever.   HENT: Positive for ear pain.           Objective    /85 (BP Location: Right arm, Patient Position: Chair, Cuff Size: Adult Regular)   Pulse 101   Temp 97.8  F (36.6  C) (Oral)   Resp 16   Ht 1.715 m (5' 7.5\")   Wt 62.1 kg (137 lb)   LMP 10/04/2022   SpO2 99%   BMI 21.14 kg/m    Body mass index is 21.14 kg/m .  Physical Exam  Vitals reviewed.   HENT:      " Right Ear: Tympanic membrane normal.      Left Ear: Tympanic membrane normal.      Nose: Congestion present. No rhinorrhea.

## 2022-10-24 NOTE — TELEPHONE ENCOUNTER
Pt reports feeling lightheaded and unsteady on and off for past 30 min. She denies any spinning sensation but says the episodes of lightheadedness are triggered by head movement especially sudden head movement or bending over then standing back up. No ear pain, headache nausea or vomiting. No speech or vision changes. No numbness or weakness in extremities.Drinking less than usual amount of water today. Now hydrating w/ water. Advised see provider w/i 24 hours. Pt voiced understanding and agreement.       Reason for Disposition    [1] MODERATE dizziness (e.g., interferes with normal activities) AND [2] has NOT been evaluated by physician for this  (Exception: dizziness caused by heat exposure, sudden standing, or poor fluid intake)    [1] MODERATE dizziness (e.g., vertigo; feels very unsteady, interferes with normal activities) AND [2] has NOT been evaluated by physician for this    Additional Information    Negative: [1] Weakness (i.e., paralysis, loss of muscle strength) of the face, arm or leg on one side of the body AND [2] sudden onset AND [3] present now    Negative: [1] Numbness (i.e., loss of sensation) of the face, arm or leg on one side of the body AND [2] sudden onset AND [3] present now    Negative: [1] Loss of speech or garbled speech AND [2] sudden onset AND [3] present now    Negative: Difficult to awaken or acting confused (e.g., disoriented, slurred speech)    Negative: Sounds like a life-threatening emergency to the triager    Negative: SEVERE difficulty breathing (e.g., struggling for each breath, speaks in single words)    Negative: [1] Difficulty breathing or swallowing AND [2] started suddenly after medicine, an allergic food or bee sting    Negative: Shock suspected (e.g., cold/pale/clammy skin, too weak to stand, low BP, rapid pulse)    Negative: Difficult to awaken or acting confused (e.g., disoriented, slurred speech)    Negative: [1] Weakness (i.e., paralysis, loss of muscle strength) of  "the face, arm or leg on one side of the body AND [2] sudden onset AND [3] present now    Negative: [1] Numbness (i.e., loss of sensation) of the face, arm or leg on one side of the body AND [2] sudden onset AND [3] present now    Negative: [1] Loss of speech or garbled speech AND [2] sudden onset AND [3] present now    Negative: Overdose (accidental or intentional) of medications    Negative: [1] Fainted > 15 minutes ago AND [2] still feels too weak or dizzy to stand    Negative: Heart beating < 50 beats per minute OR > 140 beats per minute    Negative: Sounds like a life-threatening emergency to the triager    Negative: Chest pain    Negative: Rectal bleeding, bloody stool, or tarry-black stool    Negative: [1] Vomiting AND [2] contains red blood or black (\"coffee ground\") material    Negative: Vomiting is main symptom    Negative: Diarrhea is main symptom    Negative: Headache is main symptom    Negative: Patient states that they are having an anxiety or panic attack    Negative: Dizziness from low blood sugar (i.e., < 60 mg/dl or 3.5 mmol/l)    Negative: Dizziness is described as a spinning sensation (i.e., vertigo)    Negative: Heat exhaustion suspected (i.e., dehydration from heat exposure)    Negative: Difficulty breathing    Negative: SEVERE dizziness (e.g., unable to stand, requires support to walk, feels like passing out now)    Negative: Extra heart beats OR irregular heart beating (i.e., \"palpitations\")    Negative: [1] Drinking very little AND [2] dehydration suspected (e.g., no urine > 12 hours, very dry mouth, very lightheaded)    Negative: [1] Weakness (i.e., paralysis, loss of muscle strength) of the face, arm / hand, or leg / foot on one side of the body AND [2] sudden onset AND [3] brief (now gone)    Negative: [1] Numbness (i.e., loss of sensation) of the face, arm / hand, or leg / foot on one side of the body AND [2] sudden onset AND [3] brief (now gone)    Negative: [1] Loss of speech or garbled " speech AND [2] sudden onset AND [3] brief (now gone)    Negative: Loss of vision or double vision (Exception: similar to previous migraines)    Negative: Patient sounds very sick or weak to the triager    Negative: [1] Dizziness caused by heat exposure, sudden standing, or poor fluid intake AND [2] no improvement after 2 hours of rest and fluids    Negative: [1] Fever > 103 F (39.4 C) AND [2] not able to get the fever down using Fever Care Advice    Negative: [1] Fever > 101 F (38.3 C) AND [2] age > 60 years    Negative: [1] Fever > 100.0 F (37.8 C) AND [2] bedridden (e.g., nursing home patient, CVA, chronic illness, recovering from surgery)    Negative: [1] Fever > 100.0 F (37.8 C) AND [2] diabetes mellitus or weak immune system (e.g., HIV positive, cancer chemo, splenectomy, organ transplant, chronic steroids)    Negative: Followed a head injury    Negative: Followed an ear injury    Negative: Localized weakness or numbness is main symptom    Negative: Dizziness relates to riding in a car, going to an amusement park, etc.    Negative: [1] Dizziness is main symptom AND [2] NO spinning sensation (i.e., vertigo)    Negative: SEVERE dizziness (vertigo) (e.g., unable to walk without assistance)    Negative: Severe headache (e.g., excruciating)  (Exception: similar to previous migraines)    Negative: [1] Dizziness (vertigo) present now AND [2] one or more STROKE RISK FACTORS (i.e., hypertension, diabetes, prior stroke/TIA, heart attack)  (Exception: prior physician evaluation for this AND no different/worse than usual)    Negative: [1] Dizziness (vertigo) present now AND [2] age > 59  (Exception: prior physician evaluation for this AND no different/worse than usual)    Negative: [1] Weakness (i.e., paralysis, loss of muscle strength) of the face, arm / hand, or leg / foot on one side of the body AND [2] sudden onset AND [3] brief (now gone)    Negative: [1] Numbness (i.e., loss of sensation) of the face, arm / hand, or  leg / foot on one side of the body AND [2] sudden onset AND [3] brief (now gone)    Negative: [1] Loss of speech or garbled speech AND [2] sudden onset AND [3] brief (now gone)    Negative: Loss of vision or double vision (Exception: similar to previous migraines)    Negative: Patient sounds very sick or weak to the triager    Negative: Taking a medicine that could cause dizziness (e.g., phenytoin [Dilantin], carbamazepine [Tegretol], primidone [Mysoline])    Protocols used: DIZZINESS - [id number], DIZZINESS - VERTIGO-A-AH

## 2022-11-07 ENCOUNTER — E-VISIT (OUTPATIENT)
Dept: FAMILY MEDICINE | Facility: CLINIC | Age: 22
End: 2022-11-07
Payer: COMMERCIAL

## 2022-11-07 DIAGNOSIS — R20.2 TINGLING IN EXTREMITIES: Primary | ICD-10-CM

## 2022-11-07 PROCEDURE — 99422 OL DIG E/M SVC 11-20 MIN: CPT | Performed by: FAMILY MEDICINE

## 2022-11-07 NOTE — PATIENT INSTRUCTIONS
Thank you for choosing us for your care. Given your symptoms, I would like you to do a lab-only visit to determine what is causing them.  I have placed the orders.  Please schedule an appointment with the [place], or call [phone].  I will let you know when the results are back and next steps to take.

## 2022-11-09 ENCOUNTER — LAB (OUTPATIENT)
Dept: LAB | Facility: CLINIC | Age: 22
End: 2022-11-09
Payer: COMMERCIAL

## 2022-11-09 DIAGNOSIS — R20.2 TINGLING IN EXTREMITIES: ICD-10-CM

## 2022-11-09 LAB
ALBUMIN SERPL BCG-MCNC: 4.8 G/DL (ref 3.5–5.2)
ALP SERPL-CCNC: 52 U/L (ref 35–104)
ALT SERPL W P-5'-P-CCNC: 12 U/L (ref 10–35)
ANION GAP SERPL CALCULATED.3IONS-SCNC: 14 MMOL/L (ref 7–15)
AST SERPL W P-5'-P-CCNC: 16 U/L (ref 10–35)
BILIRUB SERPL-MCNC: 0.8 MG/DL
BUN SERPL-MCNC: 8.4 MG/DL (ref 6–20)
CALCIUM SERPL-MCNC: 10 MG/DL (ref 8.6–10)
CHLORIDE SERPL-SCNC: 101 MMOL/L (ref 98–107)
CREAT SERPL-MCNC: 0.75 MG/DL (ref 0.51–0.95)
DEPRECATED HCO3 PLAS-SCNC: 23 MMOL/L (ref 22–29)
ERYTHROCYTE [DISTWIDTH] IN BLOOD BY AUTOMATED COUNT: 12.2 % (ref 10–15)
GFR SERPL CREATININE-BSD FRML MDRD: >90 ML/MIN/1.73M2
GLUCOSE SERPL-MCNC: 85 MG/DL (ref 70–99)
HCT VFR BLD AUTO: 42.9 % (ref 35–47)
HGB BLD-MCNC: 14.1 G/DL (ref 11.7–15.7)
IRON BINDING CAPACITY (ROCHE): 368 UG/DL (ref 240–430)
IRON SATN MFR SERPL: 45 % (ref 15–46)
IRON SERPL-MCNC: 164 UG/DL (ref 37–145)
MCH RBC QN AUTO: 29.6 PG (ref 26.5–33)
MCHC RBC AUTO-ENTMCNC: 32.9 G/DL (ref 31.5–36.5)
MCV RBC AUTO: 90 FL (ref 78–100)
PLATELET # BLD AUTO: 315 10E3/UL (ref 150–450)
POTASSIUM SERPL-SCNC: 4.3 MMOL/L (ref 3.4–5.3)
PROT SERPL-MCNC: 7.7 G/DL (ref 6.4–8.3)
RBC # BLD AUTO: 4.77 10E6/UL (ref 3.8–5.2)
SODIUM SERPL-SCNC: 138 MMOL/L (ref 136–145)
TSH SERPL DL<=0.005 MIU/L-ACNC: 0.89 UIU/ML (ref 0.3–4.2)
VIT B12 SERPL-MCNC: 220 PG/ML (ref 232–1245)
WBC # BLD AUTO: 7.5 10E3/UL (ref 4–11)

## 2022-11-09 PROCEDURE — 82607 VITAMIN B-12: CPT

## 2022-11-09 PROCEDURE — 82306 VITAMIN D 25 HYDROXY: CPT

## 2022-11-09 PROCEDURE — 83550 IRON BINDING TEST: CPT

## 2022-11-09 PROCEDURE — 83540 ASSAY OF IRON: CPT

## 2022-11-09 PROCEDURE — 85027 COMPLETE CBC AUTOMATED: CPT

## 2022-11-09 PROCEDURE — 84443 ASSAY THYROID STIM HORMONE: CPT

## 2022-11-09 PROCEDURE — 36415 COLL VENOUS BLD VENIPUNCTURE: CPT

## 2022-11-09 PROCEDURE — 80053 COMPREHEN METABOLIC PANEL: CPT

## 2022-11-10 LAB — DEPRECATED CALCIDIOL+CALCIFEROL SERPL-MC: 33 UG/L (ref 20–75)

## 2022-11-21 ENCOUNTER — OFFICE VISIT (OUTPATIENT)
Dept: FAMILY MEDICINE | Facility: CLINIC | Age: 22
End: 2022-11-21
Payer: COMMERCIAL

## 2022-11-21 VITALS
OXYGEN SATURATION: 98 % | SYSTOLIC BLOOD PRESSURE: 128 MMHG | BODY MASS INDEX: 21.6 KG/M2 | TEMPERATURE: 98.1 F | DIASTOLIC BLOOD PRESSURE: 80 MMHG | HEART RATE: 92 BPM | RESPIRATION RATE: 14 BRPM | WEIGHT: 140 LBS

## 2022-11-21 DIAGNOSIS — F41.9 ANXIETY: ICD-10-CM

## 2022-11-21 DIAGNOSIS — N89.8 VAGINAL DISCHARGE: ICD-10-CM

## 2022-11-21 DIAGNOSIS — H65.03 BILATERAL ACUTE SEROUS OTITIS MEDIA, RECURRENCE NOT SPECIFIED: Primary | ICD-10-CM

## 2022-11-21 DIAGNOSIS — E53.8 VITAMIN B12 DEFICIENCY (NON ANEMIC): ICD-10-CM

## 2022-11-21 LAB
CLUE CELLS: ABNORMAL
TRICHOMONAS, WET PREP: ABNORMAL
WBC'S/HIGH POWER FIELD, WET PREP: ABNORMAL
YEAST, WET PREP: ABNORMAL

## 2022-11-21 PROCEDURE — 99214 OFFICE O/P EST MOD 30 MIN: CPT | Performed by: FAMILY MEDICINE

## 2022-11-21 PROCEDURE — 87210 SMEAR WET MOUNT SALINE/INK: CPT | Performed by: FAMILY MEDICINE

## 2022-11-21 NOTE — PROGRESS NOTES
Assessment & Plan     Bilateral acute serous otitis media, recurrence not specified  Recommended continued use of Neti pot, nasal spray if she can tolerate, decongestants if she would like.  Discussed that this issue can persist for weeks, but if not resolved after about 6-8 weeks, consider follow-up with ENT to discuss.  Patient to keep me posted.    Vitamin B12 deficiency (non anemic)  Discussed that this is not the cause of her current symptoms.  She has been taking a B complex vitamin.    Vaginal discharge  Wet prep is negative today.  Discussed routine vaginal hygiene.  - Wet prep - Clinic Collect    Anxiety  Patient becomes tearful when talking about this.  Recommended seeing a therapist and she wishes to try to find a local therapist on her own prior to having me place a referral.                   Return in about 10 months (around 9/21/2023) for Routine preventive.    Maria Elena Robertson MD  [facility]    Jona Aguayo is a 22 year old, presenting for the following health issues:  Sinus Problem (Off & on for a few weeks ; more recently ear popping, head pressure), Musculoskeletal Problem (Left shoulder tightness - off & on for a few weeks), Vaginal Problem (Since taking antibiotics noticed additional discharge), and Results (Wants to discuss about b-12 deficiency )      History of Present Illness       Reason for visit:  Sinus pressure and ears popping, tight shoulder, also curious if I got a yeast infection from the antibiotics I was on    She eats 2-3 servings of fruits and vegetables daily.She consumes 2 sweetened beverage(s) daily.She exercises with enough effort to increase her heart rate 20 to 29 minutes per day.  She exercises with enough effort to increase her heart rate 4 days per week.   She is taking medications regularly.     Patient presents today to discuss a few concerns.  First, she reports continued ear pressure and popping in her ears.  She denies  "diminished hearing.  She has been taking Allegra.  She completed a course of doxycycline at the end of October.  Since taking antibiotics, she has had some increased vaginal discharge.  She denies significant symptoms of itching or pain, but would like this checked since she is here.  She also reports waking up feeling that her heart is \"racing\".  This has happened 3 times total, most recently 1 week ago.  She feels that she has been \"noticing\" her heart more.      Review of Systems   Constitutional, HEENT, cardiovascular, pulmonary, gi and gu systems are negative, except as otherwise noted.      Objective    /80 (BP Location: Right arm, Patient Position: Sitting, Cuff Size: Adult Regular)   Pulse 92   Temp 98.1  F (36.7  C) (Temporal)   Resp 14   Wt 63.5 kg (140 lb)   LMP 11/01/2022   SpO2 98%   BMI 21.60 kg/m    Body mass index is 21.6 kg/m .  Physical Exam   GENERAL: healthy, alert and no distress  HENT: ear canals and TM's normal, nose and mouth without ulcers or lesions  NECK: no adenopathy, no asymmetry, masses, or scars and thyroid normal to palpation  RESP: lungs clear to auscultation - no rales, rhonchi or wheezes  CV: regular rate and rhythm, normal S1 S2, no S3 or S4, no murmur, click or rub, no peripheral edema and peripheral pulses strong  MS: no gross musculoskeletal defects noted, no edema  SKIN: no suspicious lesions or rashes  NEURO: Normal strength and tone, mentation intact and speech normal  PSYCH: mentation appears normal, affect anxious  : normal external genitalia, minimal vaginal discharge sampled for wet prep    Results for orders placed or performed in visit on 11/21/22   Wet prep - Clinic Collect     Status: Abnormal    Specimen: Vagina; Swab   Result Value Ref Range    Trichomonas Absent Absent    Yeast Absent Absent    Clue Cells Absent Absent    WBCs/high power field 1+ (A) None                   "

## 2022-11-26 PROBLEM — E53.8 VITAMIN B12 DEFICIENCY (NON ANEMIC): Status: ACTIVE | Noted: 2022-11-26

## 2022-11-26 PROBLEM — F41.9 ANXIETY: Status: ACTIVE | Noted: 2022-11-26

## 2022-12-05 ENCOUNTER — VIRTUAL VISIT (OUTPATIENT)
Dept: FAMILY MEDICINE | Facility: CLINIC | Age: 22
End: 2022-12-05
Payer: COMMERCIAL

## 2022-12-05 DIAGNOSIS — M54.12 CERVICAL RADICULOPATHY: ICD-10-CM

## 2022-12-05 DIAGNOSIS — M62.838 MUSCLE SPASM: ICD-10-CM

## 2022-12-05 DIAGNOSIS — R00.0 RACING HEART BEAT: ICD-10-CM

## 2022-12-05 DIAGNOSIS — F41.9 ANXIETY: ICD-10-CM

## 2022-12-05 DIAGNOSIS — M54.2 NECK PAIN: Primary | ICD-10-CM

## 2022-12-05 PROCEDURE — 99213 OFFICE O/P EST LOW 20 MIN: CPT | Mod: 95 | Performed by: PHYSICIAN ASSISTANT

## 2022-12-05 NOTE — PATIENT INSTRUCTIONS
Sorry I hung up the call too early on accident! I was planning to say goodbye as well :)    To schedule cardiac procedure, please call: [phone]      Exercises When you're feeling overwhelmed:  1. Deep breathing from your diaphragm. Put your hand over your belly if that helps. Breathe in through your nose, hold, out through your mouth   - 4 square breathing into the belly (4 second breathe IN through the nose into the belly, 4 seconds HOLD, 4 second breathe OUT through the mouth,4 seconds HOLD. repeat.)   -  try 4-7-8 method (4 seconds IN, 7seconds HOLD, 8 seconds OUT). Try shaping your mouth as if you are breathing out through a straw for a long slow exhale.  - Visualize breathing in a calming BLUE, allowing that blue to circulate & collect stressful energy, turning PURPLE, and breathing out RED as a release.   2. Muscle tension/relaxation - squeeze your fists/forearms then release them to release tension  3. Grounding yourself. 5-4-3-2-1. 5- things you see, 4- things you feel, 3- things you hear, 2- things you taste/smell, 1- how am I feeling? What's one good thing about myself?  4. Thoughts turn to feelings turn to actions. You'll notice this when a negative thought can make you feel anxious or down, and in turn you act differently.  5. So turn around the negative thought by counteracting it with a positive one. What can the positive side of you say to that negative thought?  6. Think: when I worry I feel like I'm accomplishing something but I'm not. When is a good time to worry, when is a bad time?        Other therapy/counseling options:   BetterHelp : counseling sessions online ($)    [facility] -  affordable, in-office and online psychotherapy sessions between $30 and $60      Apps :  MyLife - Headspace - Smiling Mind -  Abide -- Calm -- Shine -- Insight Timer - Ten Percent Happier  - these are helpful for daily mindfulness and meditation, as well as sleep meditations to help you  fall asleep more quickly and rest better  Woebot: free lizz which trains you in individualized CBT (cognitive behavioral therapy) and mindfulness, and checks in with you    Self care toolkit of apps (all of these are free to download on google play and Busuu):  - take a breather: Smiling Mind, Headspace, Stop Breathe & Think  - talk it out: Joe Lei, Porsha  - Game your goals: Happify, MoodMission, SuperBetter  - Track your thoughts: [place], Catch It, Daylio    For more information on finding the right lizz for you, visit:   https://PeopleDocberguide.org/      Helpful Websites:   Visit www.[employer].org for stress tools  Visit www.[employer] for guided meditation  Visit www.adaa.org for anxiety and depression resources    Good information and free webinars for managing mental health issues: https://adaa.org/understanding-anxiety  [facility] Support groups in the [facility]      Yoga/mindfulness: youtube videos, apps    Biofeedback: Technology Resources  Home Biofeedback Gadgets - Muse EEG Biofeedback Headband    www.choosemuse.com -  PIP  KOFI Biofeedback Finger Sensor  www.thepip.com

## 2022-12-05 NOTE — PROGRESS NOTES
Dede is a 22 year old who is being evaluated via a billable video visit.      How would you like to obtain your AVS? MyChart  If the video visit is dropped, the invitation should be resent by: Text to cell phone: [phone]  Will anyone else be joining your video visit? No    Assessment & Plan     ASSESSMENT/PLAN:      ICD-10-CM    1. Neck pain  M54.2 Spine  Referral     Physical Therapy Referral      2. Muscle spasm  M62.838 Spine  Referral     Physical Therapy Referral      3. Cervical radiculopathy  M54.12 Spine  Referral     Physical Therapy Referral      4. Racing heart beat  R00.0 Adult Leadless EKG Monitor 3 to 7 Days      5. Anxiety  F41.9         - suspect radicular pain down arm from muscle tension. Will start with physical therapy, spine referral.   - racing heart beat: could be from anxiety, will check zio patch. Has had recent normal blood work.  Patient does note high anxiety, defers treatment/counseling at this time. Resources given.  - red flag signs to be seen urgently were discussed.    Patient Instructions   Sorry I hung up the call too early on accident! I was planning to say goodbye as well :)    To schedule cardiac procedure, please call: [phone]    mindfulness exercises and resources    Return in about 2 weeks (around 12/19/2022) for if not improving or if worsening.    Daphney Estrada PA-C  [facility] Agu[facility] is a 22 year old, presenting for the following health issues:  Palpitations      History of Present Illness       Reason for visit:  Arm pain off and on and feeling my heart beating quickly occasionally.    She eats 2-3 servings of fruits and vegetables daily.She consumes 2 sweetened beverage(s) daily.She exercises with enough effort to increase her heart rate 20 to 29 minutes per day.  She exercises with enough effort to increase her heart rate 4 days per week.   She is taking medications regularly.     *  Heart  racing off and on for the last week, left arm pain/tightness and tingling in hands. Will sometimes wake up at night with heart racing. Was seen in clinic 11/21/2022 and was told that if things continue to follow up.    Arm pain is more persistent - about a month. Starts periscapular/trapezius and sometimes shoots down to different parts of her lower arm (sometimes sharp pain, paresthesias). And some pain left side of neck.   Her friend is a physical therapist, recommended some exercises which she is doing.  No injury or recent travel. Feels like tight muscles/muscle spasm. Using a tennis ball to roll and can roll across a muscle knot. OTC muscle cream really helps.    Will wake up at night with heart racing, or sporadically in the day. At least once a day. Goes away pretty quickly - no associated symptoms such as dizziness, SOB, chest pain. Not palpitations, feels like racing. Has a lot of stress. Mom and brother have both had heart rate monitors but were okay.  Recent labwork discussed  One cup coffee daily, no change      Review of Systems   Other than noted above, general, HEENT, respiratory, cardiac, MS, and gastrointestinal systems are negative.       Objective           Vitals:  No vitals were obtained today due to virtual visit.    Physical Exam   GENERAL: Healthy, alert and no distress  EYES: Eyes grossly normal to inspection.  No discharge or erythema, or obvious scleral/conjunctival abnormalities.  RESP: No audible wheeze, cough, or visible cyanosis.  No visible retractions or increased work of breathing.    SKIN: Visible skin clear. No significant rash, abnormal pigmentation or lesions.  NEURO: Cranial nerves grossly intact.  Mentation and speech appropriate for age.  PSYCH: Mentation appears normal, affect normal/bright, judgement and insight intact, normal speech and appearance well-groomed.        Video-Visit Details    Video Start Time: 12:59 PM    Type of service:  Video Visit    Video End Time:1:23  PM    Originating Location (pt. Location): Home    Distant Location (provider location):  On-site    Platform used for Video Visit: Nighat

## 2022-12-07 ENCOUNTER — THERAPY VISIT (OUTPATIENT)
Dept: PHYSICAL THERAPY | Facility: CLINIC | Age: 22
End: 2022-12-07
Attending: PHYSICIAN ASSISTANT
Payer: COMMERCIAL

## 2022-12-07 DIAGNOSIS — M54.2 NECK PAIN: ICD-10-CM

## 2022-12-07 DIAGNOSIS — M54.12 CERVICAL RADICULOPATHY: ICD-10-CM

## 2022-12-07 DIAGNOSIS — M62.838 MUSCLE SPASM: ICD-10-CM

## 2022-12-07 PROCEDURE — 97110 THERAPEUTIC EXERCISES: CPT | Mod: GP | Performed by: PHYSICAL THERAPIST

## 2022-12-07 PROCEDURE — 97140 MANUAL THERAPY 1/> REGIONS: CPT | Mod: GP | Performed by: PHYSICAL THERAPIST

## 2022-12-07 PROCEDURE — 97162 PT EVAL MOD COMPLEX 30 MIN: CPT | Mod: GP | Performed by: PHYSICAL THERAPIST

## 2022-12-07 NOTE — PROGRESS NOTES
Physical Therapy Initial Evaluation  Subjective:    Patient Health History  Dede Robertson being seen for Tightness and pain in the shoulder blade, armpit, into my arm.     Problem began: 11/7/2022.   Problem occurred: I often have tight muscles in my left shoulder, but slept and woke up and I felt the irritation in my arm   Pain is reported as 1/10 on pain scale.  General health as reported by patient is excellent.  Pertinent medical history includes: none.     Medical allergies: other. Other medical allergies details: Amoxicillin.   Surgeries include:  None.    Current medications:  Other. Other medications details: Birth control, probiotic, b complex vitamin.    Current occupation is .   Primary job tasks include:  Computer work and prolonged sitting.                  Therapist Generated HPI Evaluation  Problem details: Pt has been doing a little better overall, but has always had knots around scapula, then about a month ago had pain go into left arm and armpit area.  She is left-hand dominant. She has been doing some self- MFR and rowing and this feels ok but after rowing her arm can hurt..         Type of problem:  Cervical spine.    This is a new condition.  Condition occurred with:  Insidious onset.  Where condition occurred: for unknown reasons.  Patient reports pain:  Cervical left side.  Pain is described as aching and is intermittent.  Pain radiates to:  Head. Pain is worse in the P.M..  Since onset symptoms are gradually improving.  Associated symptoms:  Headache.                                Objective:        Flexibility/Screens:     Upper Extremity:    Decreased left upper extremity flexibility at:  Pectoralis Major and Pectoralis Minor    Decreased right upper extremity flexibility present at:  Pectoralis Major and Pectoralis Minor                      Cervical/Thoracic Evaluation    AROM:  AROM Cervical:    Flexion:          Tight paraspinals  Extension:       End-range pain, good  motion  Rotation:         Left:     Right:  Side Bend:      Left: mild tightness     Right:  Mild tightness        Cervical Myotomes:  normal                      Cervical Dermatomes:  normal                    Cervical Palpation:    Tenderness present at Left:    Upper Trap (and post rotator cuff) and Erector Spinae  Tenderness not present at Left:   Suboccipitals  Tenderness present at Right:    Upper Trap and Erector Spinae      Spinal Segmental Conclusions:    Level:  Hypo at C3 and C4                                                General     ROS    Assessment/Plan:    Patient is a 22 year old female with cervical complaints.    Patient has the following significant findings with corresponding treatment plan.                Diagnosis 1:  Neck pain, radiculopathy  Pain -  manual therapy  Decreased ROM/flexibility - manual therapy and therapeutic exercise  Decreased strength - therapeutic exercise and therapeutic activities, neuro re-ed    Therapy Evaluation Codes:   1) History comprised of:   Personal factors that impact the plan of care:      Time since onset of symptoms.    Comorbidity factors that impact the plan of care are:      None.     Medications impacting care: None.  2) Examination of Body Systems comprised of:   Body structures and functions that impact the plan of care:      Cervical spine.   Activity limitations that impact the plan of care are:      Lifting, Reading/Computer work and Sleeping.  3) Clinical presentation characteristics are:   Evolving/Changing.  4) Decision-Making    Moderate complexity using standardized patient assessment instrument and/or measureable assessment of functional outcome.  Cumulative Therapy Evaluation is: Moderate complexity.    Previous and current functional limitations:  (See Goal Flow Sheet for this information)    Short term and Long term goals: (See Goal Flow Sheet for this information)     Communication ability:  Patient appears to be able to clearly  communicate and understand verbal and written communication and follow directions correctly.  Treatment Explanation - The following has been discussed with the patient:   RX ordered/plan of care  Anticipated outcomes  Possible risks and side effects  This patient would benefit from PT intervention to resume normal activities.   Rehab potential is excellent.    Frequency:  1 X week, once daily  Duration:  for 6 weeks  Discharge Plan:  Achieve all LTG.  Independent in home treatment program.  Reach maximal therapeutic benefit.    Please refer to the daily flowsheet for treatment today, total treatment time and time spent performing 1:1 timed codes.

## 2022-12-13 ENCOUNTER — HOSPITAL ENCOUNTER (OUTPATIENT)
Dept: CARDIOLOGY | Facility: CLINIC | Age: 22
Discharge: HOME | End: 2022-12-13
Attending: PHYSICIAN ASSISTANT | Admitting: PHYSICIAN ASSISTANT
Payer: COMMERCIAL

## 2022-12-13 DIAGNOSIS — R00.0 RACING HEART BEAT: ICD-10-CM

## 2022-12-13 PROCEDURE — 93242 EXT ECG>48HR<7D RECORDING: CPT

## 2022-12-13 PROCEDURE — 93244 EXT ECG>48HR<7D REV&INTERPJ: CPT | Performed by: INTERNAL MEDICINE

## 2022-12-14 ENCOUNTER — THERAPY VISIT (OUTPATIENT)
Dept: PHYSICAL THERAPY | Facility: CLINIC | Age: 22
End: 2022-12-14
Attending: PHYSICIAN ASSISTANT
Payer: COMMERCIAL

## 2022-12-14 DIAGNOSIS — M54.2 NECK PAIN: Primary | ICD-10-CM

## 2022-12-14 DIAGNOSIS — M54.12 CERVICAL RADICULOPATHY: ICD-10-CM

## 2022-12-14 PROCEDURE — 97140 MANUAL THERAPY 1/> REGIONS: CPT | Mod: GP | Performed by: PHYSICAL THERAPIST

## 2022-12-14 PROCEDURE — 97110 THERAPEUTIC EXERCISES: CPT | Mod: GP | Performed by: PHYSICAL THERAPIST

## 2022-12-14 PROCEDURE — 97112 NEUROMUSCULAR REEDUCATION: CPT | Mod: GP | Performed by: PHYSICAL THERAPIST

## 2023-02-22 ENCOUNTER — OFFICE VISIT (OUTPATIENT)
Dept: FAMILY MEDICINE | Facility: CLINIC | Age: 23
End: 2023-02-22
Payer: COMMERCIAL

## 2023-02-22 VITALS
WEIGHT: 133.9 LBS | TEMPERATURE: 98.4 F | HEIGHT: 68 IN | RESPIRATION RATE: 15 BRPM | OXYGEN SATURATION: 100 % | DIASTOLIC BLOOD PRESSURE: 87 MMHG | HEART RATE: 95 BPM | BODY MASS INDEX: 20.29 KG/M2 | SYSTOLIC BLOOD PRESSURE: 134 MMHG

## 2023-02-22 DIAGNOSIS — R59.1 LYMPHADENOPATHY: Primary | ICD-10-CM

## 2023-02-22 DIAGNOSIS — N63.21 MASS OF UPPER OUTER QUADRANT OF LEFT BREAST: ICD-10-CM

## 2023-02-22 PROCEDURE — 99213 OFFICE O/P EST LOW 20 MIN: CPT | Performed by: PHYSICIAN ASSISTANT

## 2023-02-22 NOTE — PATIENT INSTRUCTIONS
Ultrasound: Please contact [facility]  at [phone] to schedule appointment     Monitor for changes, please notify me if not going away  Monitor breast lump in 1-2 weeks (after your period)

## 2023-02-22 NOTE — PROGRESS NOTES
Assessment & Plan     ASSESSMENT/PLAN:      ICD-10-CM    1. Lymphadenopathy  R59.1 US Head Neck Soft Tissue      2. Mass of upper outer quadrant of left breast  N63.21 US Breast Left Limited 1-3 Quadrants     MA Diagnostic Digital Bilateral        We discussed further labs such as CBC/HIV. She had normal CBC this fall. Deferred for now. Discussed ultrasound as next step in evaluation, possible ENT referral. Breast cyst could be hormonal cyst, will monitor. Cervical adenopathy could be related to congestion/sinus issues this winter.    Patient Instructions   Ultrasound: Please contact [facility]  at [phone] to schedule appointment     Monitor for changes, please notify me if not going away  Monitor breast lump in 1-2 weeks (after your period)    Return in about 3 months (around 5/22/2023) for if not improving or if worsening.    SCOT Galarza [facility] JOSE ALFREDO Aguayo is a 22 year old, presenting for the following health issues:  Mass      History of Present Illness       Reason for visit:  A bump in the side of my neck, seems to be a node, just wanted to check in on it  Symptom onset:  More than a month  Symptoms include:  Noticed the bump a little over a month ago  Symptom intensity:  Mild  Symptom progression:  Staying the same  Had these symptoms before:  No  What makes it worse:  No    She eats 2-3 servings of fruits and vegetables daily.She consumes 1 sweetened beverage(s) daily.She exercises with enough effort to increase her heart rate 20 to 29 minutes per day.  She exercises with enough effort to increase her heart rate 4 days per week.   She is taking medications regularly.     *  Lump on right side of neck she would like looked at, she states that it has not been getting larger    * neck pain in general is a lot better, no longer any radicular pain    She has had ear issues this fall, sinuses more irritated this winter in general.  "Last cold was November. Has had prolonged cough due to dryness/drainage.   Node is nontender  This is the side of tightness    She also noticed maybe cysts left outer breast. She has period now. It does seem bigger with period.  Mom gets breast cysts  No family history of breast cancer    Review of Systems   Other than noted above, general, HEENT, respiratory, cardiac, MS, and gastrointestinal systems are negative.       Objective    /87   Pulse 95   Temp 98.4  F (36.9  C) (Tympanic)   Resp 15   Ht 1.738 m (5' 8.43\")   Wt 60.7 kg (133 lb 14.4 oz)   LMP 02/21/2023 (Exact Date)   SpO2 100%   BMI 20.11 kg/m    Body mass index is 20.11 kg/m .  Physical Exam   GENERAL: healthy, alert and no distress  EYES: Eyes grossly normal to inspection, PERRL and conjunctivae and sclerae normal  HENT: ear canals and TM's normal, nose and mouth without ulcers or lesions  NECK: POSITIVE bilateral adenopathy - mid anterior cervical chain. Nontender R>L in size. Mobile not fixed., no asymmetry, masses, or scars and thyroid normal to palpation  RESP: lungs clear to auscultation - no rales, rhonchi or wheezes  BREAST: normal without tenderness or nipple discharge and no palpable axillary masses or adenopathy  POSITIVE fibrocystic breasts bilaterally . left outer breast mobile rounded lump nontender, >pea sized  CV: regular rate and rhythm, normal S1 S2, no S3 or S4, no murmur, click or rub, no peripheral edema and peripheral pulses strong  ABDOMEN: soft, nontender, no hepatosplenomegaly, no masses and bowel sounds normal  MS: no gross musculoskeletal defects noted, no edema                  "

## 2023-03-06 ENCOUNTER — ANCILLARY PROCEDURE (OUTPATIENT)
Dept: ULTRASOUND IMAGING | Facility: CLINIC | Age: 23
End: 2023-03-06
Attending: PHYSICIAN ASSISTANT
Payer: COMMERCIAL

## 2023-03-06 ENCOUNTER — ANCILLARY PROCEDURE (OUTPATIENT)
Dept: MAMMOGRAPHY | Facility: CLINIC | Age: 23
End: 2023-03-06
Attending: PHYSICIAN ASSISTANT
Payer: COMMERCIAL

## 2023-03-06 DIAGNOSIS — N63.21 MASS OF UPPER OUTER QUADRANT OF LEFT BREAST: ICD-10-CM

## 2023-03-06 DIAGNOSIS — R59.1 LYMPHADENOPATHY: ICD-10-CM

## 2023-03-06 PROCEDURE — 76536 US EXAM OF HEAD AND NECK: CPT | Performed by: SURGERY

## 2023-03-06 PROCEDURE — 76642 ULTRASOUND BREAST LIMITED: CPT | Mod: LT | Performed by: RADIOLOGY

## 2023-04-28 NOTE — TELEPHONE ENCOUNTER
REFERRAL INFORMATION:    Referring Provider:  Medina Giang MD    Referring Clinic:  [place]     Reason for Visit/Diagnosis: Abdominal pain, generalized     FUTURE VISIT INFORMATION:    Appointment Date: 5/10/2023    Appointment Time:      NOTES STATUS DETAILS   OFFICE NOTE from Referring Provider Internal 4/25/2023, 9/27/2022, 8/10/2020, 10/11/2017 MyC Message with ARUN Giang   OFFICE NOTE from Other Specialist Internal 7/19/2021 OV with CHIP Estrada   HOSPITAL DISCHARGE SUMMARY/  ED VISITS N/A    OPERATIVE REPORT N/A    MEDICATION LIST Internal         ENDOSCOPY  N/A    COLONOSCOPY N/A    ERCP N/A    EUS N/A    STOOL TESTING N/A    PERTINENT LABS N/A    PATHOLOGY REPORTS (RELATED) N/A    IMAGING (CT, MRI, EGD, MRCP, Small Bowel Follow Through/SBT, MR/CT Enterography) Internal 9/27/2022 XR ABD

## 2023-05-10 ENCOUNTER — VIRTUAL VISIT (OUTPATIENT)
Dept: GASTROENTEROLOGY | Facility: CLINIC | Age: 23
End: 2023-05-10
Attending: FAMILY MEDICINE
Payer: COMMERCIAL

## 2023-05-10 ENCOUNTER — PRE VISIT (OUTPATIENT)
Dept: GASTROENTEROLOGY | Facility: CLINIC | Age: 23
End: 2023-05-10

## 2023-05-10 ENCOUNTER — TELEPHONE (OUTPATIENT)
Dept: GASTROENTEROLOGY | Facility: CLINIC | Age: 23
End: 2023-05-10

## 2023-05-10 VITALS — BODY MASS INDEX: 20.27 KG/M2 | WEIGHT: 135 LBS

## 2023-05-10 DIAGNOSIS — R10.12 LUQ ABDOMINAL PAIN: ICD-10-CM

## 2023-05-10 PROCEDURE — 99203 OFFICE O/P NEW LOW 30 MIN: CPT | Mod: VID | Performed by: PHYSICIAN ASSISTANT

## 2023-05-10 RX ORDER — OMEPRAZOLE 40 MG/1
40 CAPSULE, DELAYED RELEASE ORAL DAILY
Qty: 30 CAPSULE | Refills: 1 | Status: SHIPPED | OUTPATIENT
Start: 2023-05-10 | End: 2023-09-20

## 2023-05-10 ASSESSMENT — PAIN SCALES - GENERAL: PAINLEVEL: NO PAIN (0)

## 2023-05-10 NOTE — PROGRESS NOTES
GASTROENTEROLOGY NEW PATIENT VIDEO VISIT    CC/REFERRING MD:    Medina Giang    REASON FOR CONSULTATION:   Medina Giang for   Chief Complaint   Patient presents with     Video Visit     Abdominal pain       HISTORY OF PRESENT ILLNESS:    Dede Robertson is a 23 year old female with no significant past medical history who is being evaluated via a billable video visit for recurrent left upper quadrant and epigastric abdominal pain.  Patient states that she has had this intermittently for a few years now.  She recalls a quite severe episode in 2021, occurred shortly after returning to [place] from vacation.  Was experiencing consistent pain in the left upper quadrant with some radiation to the epigastrium, was felt to be consistent with gastritis and was started on omeprazole, which did help.  Since then, she has had the pain recurrently, seems to be induced by stress but not necessarily postprandially, actually more often painful at night.  Describes sort of a fullness and a pinching sensation.  Notes that spicy foods can be a trigger.  Has used omeprazole very briefly at different times.  There is occasional associated heartburn, occasionally feels bloated.  Rarely can have pain radiate towards the lower abdomen.  Has not had any nausea, vomiting, early satiety, or unintentional weight loss.  No history of hematemesis or coffee-ground emesis.  Bowel movements have been normal, goes daily, stool is formed and easily passed.  No straining.  Did see primary care recently and they recommended use of MiraLAX for a week or 2 after seeing an abdominal x-ray with some stool burden present.  She is unsure if this made any difference for her symptoms.  She notes that she was more symptomatic last week, this week is really been not very symptomatic.    No history of surgeries.  No tobacco use, rare alcohol use, no drug use.  Family medical history pertinent for uterine cancer  in grandmother, she believes that metastasized to stomach.  No known family medical history of colorectal cancer or celiac disease.       I have reviewed and updated the patient's Past Medical History, Social History, Family History and Medication List.    Exam:    General appearance:  Healthy appearing adult, in no acute distress  Eyes:  Sclera anicteric, Pupils round and reactive to light  Ears, nose, mouth and throat:  No obvious external lesions of ears and nose.  Hearing intact  Neck:  Symmetric, No obvious external lesions  Respiratory:  Normal respiration, no use of accessory muscles   MSK:  No visual upper extremity, neck or facial muscle atrophy  ABD:  No visual abdominal distention, no audible borborygmi  Skin:  No rashes or jaundice   Psychiatric:  Oriented to person, place and time, Appropriate mood and affect.   Neurologic:  Peripheral muscle function and dexterity appear to be intact      PERTINENT STUDIES have been reviewed.    ASSESSMENT/PLAN:    Dede Robertson is a 23 year old female who presents for evaluation of intermittent left upper quadrant and epigastric pain over the past few years.  We spent some time discussing potential etiologies, which would include gastritis/peptic ulcer disease, H. pylori, functional dyspepsia, less likely recurrent pancreatitis, biliary colic, celiac disease, IBS.  Given that she is not featuring any alarm symptoms, I think it would be reasonable to trial consistent omeprazole for 2 months to see if this keeps pain from recurring.  We will follow-up at that time and reassess her symptoms.  If she develops further pain after being on the omeprazole for a couple of weeks, she will reach out to me through [employer] let me know.  I would suggest EGD at that point.  Patient stated understanding of plan and will follow-up with me in 2 months.      Video-Visit Details    Video Visit Time: 17 minutes    Type of service:  Video Visit    Originating Location (pt. Location):  Home    Distant Location (provider location):  Off-site    Platform used for Video Visit: Nighat Esquivel PA-C    RTC 2 months    Thank you for this consultation.  It was a pleasure to participate in the care of this patient; please contact us with any further questions.  A total of 26 minutes was spent with reviewing the chart, discussing with the patient, documentation and coordination of care.    This note was created with voice recognition software, and while reviewed for accuracy, typos may remain.     Mark Esquivel PA-C  Division of Gastroenterology, Hepatology and Nutrition  [facility]  [phone]

## 2023-05-10 NOTE — TELEPHONE ENCOUNTER
Left Voicemail (1st Attempt) for the patient to call back and schedule the following:    Appointment type: 2 month follow up GI   Provider: Mark Esquivel  Return date: 2 month follow up, around 7/10/23  Specialty phone number: [phone]  Additional appointment(s) needed: none   Additonal Notes: n/a

## 2023-05-10 NOTE — NURSING NOTE
Is the patient currently in the state of [place]? YES    Visit mode:VIDEO    If the visit is dropped, the patient can be reconnected by: VIDEO VISIT: Text to cell phone: [phone]    Will anyone else be joining the visit? NO      How would you like to obtain your AVS? MyChart    Are changes needed to the allergy or medication list? NO    Reason for visit: Video Visit (Abdominal pain)

## 2023-09-06 PROBLEM — R87.612 PAPANICOLAOU SMEAR OF CERVIX WITH LOW GRADE SQUAMOUS INTRAEPITHELIAL LESION (LGSIL): Status: ACTIVE | Noted: 2022-09-27

## 2023-09-26 ASSESSMENT — ENCOUNTER SYMPTOMS
CHILLS: 0
PALPITATIONS: 0
ABDOMINAL PAIN: 0
PARESTHESIAS: 0
NERVOUS/ANXIOUS: 0
HEADACHES: 0
EYE PAIN: 0
NAUSEA: 0
FEVER: 0
WEAKNESS: 0
ARTHRALGIAS: 0
CONSTIPATION: 0
COUGH: 0
SHORTNESS OF BREATH: 0
HEMATURIA: 0
SORE THROAT: 0
DIARRHEA: 0
DIZZINESS: 0
BREAST MASS: 0
JOINT SWELLING: 0
MYALGIAS: 0
HEMATOCHEZIA: 0
FREQUENCY: 0
HEARTBURN: 0
DYSURIA: 0

## 2023-09-27 ENCOUNTER — OFFICE VISIT (OUTPATIENT)
Dept: FAMILY MEDICINE | Facility: CLINIC | Age: 23
End: 2023-09-27
Payer: COMMERCIAL

## 2023-09-27 VITALS
TEMPERATURE: 98.4 F | DIASTOLIC BLOOD PRESSURE: 68 MMHG | RESPIRATION RATE: 16 BRPM | WEIGHT: 126.5 LBS | SYSTOLIC BLOOD PRESSURE: 112 MMHG | BODY MASS INDEX: 19.17 KG/M2 | HEART RATE: 106 BPM | HEIGHT: 68 IN | OXYGEN SATURATION: 100 %

## 2023-09-27 DIAGNOSIS — Z23 HIGH PRIORITY FOR 2019-NCOV VACCINE: ICD-10-CM

## 2023-09-27 DIAGNOSIS — Z12.4 CERVICAL CANCER SCREENING: ICD-10-CM

## 2023-09-27 DIAGNOSIS — L30.9 DERMATITIS: ICD-10-CM

## 2023-09-27 DIAGNOSIS — Z00.00 ROUTINE GENERAL MEDICAL EXAMINATION AT A HEALTH CARE FACILITY: Primary | ICD-10-CM

## 2023-09-27 PROCEDURE — 90480 ADMN SARSCOV2 VAC 1/ONLY CMP: CPT | Performed by: FAMILY MEDICINE

## 2023-09-27 PROCEDURE — 90686 IIV4 VACC NO PRSV 0.5 ML IM: CPT | Performed by: FAMILY MEDICINE

## 2023-09-27 PROCEDURE — 87624 HPV HI-RISK TYP POOLED RSLT: CPT | Performed by: FAMILY MEDICINE

## 2023-09-27 PROCEDURE — G0145 SCR C/V CYTO,THINLAYER,RESCR: HCPCS | Performed by: FAMILY MEDICINE

## 2023-09-27 PROCEDURE — 91320 SARSCV2 VAC 30MCG TRS-SUC IM: CPT | Performed by: FAMILY MEDICINE

## 2023-09-27 PROCEDURE — 99213 OFFICE O/P EST LOW 20 MIN: CPT | Mod: 25 | Performed by: FAMILY MEDICINE

## 2023-09-27 PROCEDURE — 90471 IMMUNIZATION ADMIN: CPT | Performed by: FAMILY MEDICINE

## 2023-09-27 PROCEDURE — 99395 PREV VISIT EST AGE 18-39: CPT | Mod: 25 | Performed by: FAMILY MEDICINE

## 2023-09-27 RX ORDER — HYDROCORTISONE 2.5 %
CREAM (GRAM) TOPICAL 2 TIMES DAILY
Qty: 30 G | Refills: 0 | Status: SHIPPED | OUTPATIENT
Start: 2023-09-27

## 2023-09-27 ASSESSMENT — ENCOUNTER SYMPTOMS
EYE PAIN: 0
BREAST MASS: 0
ABDOMINAL PAIN: 0
MYALGIAS: 0
PARESTHESIAS: 0
DIZZINESS: 0
HEARTBURN: 0
ARTHRALGIAS: 0
FEVER: 0
NERVOUS/ANXIOUS: 0
FREQUENCY: 0
CONSTIPATION: 0
HEMATURIA: 0
CHILLS: 0
JOINT SWELLING: 0
NAUSEA: 0
SHORTNESS OF BREATH: 0
HEADACHES: 0
WEAKNESS: 0
SORE THROAT: 0
DIARRHEA: 0
HEMATOCHEZIA: 0
DYSURIA: 0
PALPITATIONS: 0
COUGH: 0

## 2023-09-27 NOTE — PROGRESS NOTES
SUBJECTIVE:   CC: Dede is an 23 year old who presents for preventive health visit.       Healthy Habits:     Getting at least 3 servings of Calcium per day:  Yes    Bi-annual eye exam:  NO    Dental care twice a year:  Yes    Sleep apnea or symptoms of sleep apnea:  None    Diet:  Other    Frequency of exercise:  4-5 days/week    Duration of exercise:  30-45 minutes    Taking medications regularly:  Yes    Medication side effects:  None    Additional concerns today:  No    She is overall doing well.  She is living with her boyfriend.  They have a dog.    She is a bit nervous about her Pap smear today.  She had LSIL last year.    Also, she notes dermatitis on her inner thighs particularly when she is wearing leggings or tighter pants.    Have you ever done Advance Care Planning? (For example, a Health Directive, POLST, or a discussion with a medical provider or your loved ones about your wishes): No, advance care planning information given to patient to review.  Patient plans to discuss their wishes with loved ones or provider.      Social History     Tobacco Use    Smoking status: Never    Smokeless tobacco: Never   Substance Use Topics    Alcohol use: No             2023     6:19 PM   Alcohol Use   Prescreen: >3 drinks/day or >7 drinks/week? No     Reviewed orders with patient.  Reviewed health maintenance and updated orders accordingly - Yes  Lab work is in process    Breast Cancer Screenin/21/2023     1:02 PM   Breast CA Risk Assessment (FHS-7)   Do you have a family history of breast, colon, or ovarian cancer? No / Unknown         Patient under 40 years of age: Routine Mammogram Screening not recommended.   Pertinent mammograms are reviewed under the imaging tab.    History of abnormal Pap smear: YES - other categories - see link Cervical Cytology Screening Guidelines      2022    10:32 AM 2019     2:09 PM   PAP / HPV   PAP Low-grade squamous intraepithelial lesion (LSIL)  "encompassing HPV/mild dysplasia/CIN1  Negative for squamous intraepithelial lesion or malignancy  Electronically signed by Sarah Ma CT (ASCP) on 7/18/2019 at 12:17 PM        Reviewed and updated as needed this visit by clinical staff    Allergies  Meds              Reviewed and updated as needed this visit by Provider                     Review of Systems   Constitutional:  Negative for chills and fever.   HENT:  Negative for congestion, ear pain, hearing loss and sore throat.    Eyes:  Negative for pain and visual disturbance.   Respiratory:  Negative for cough and shortness of breath.    Cardiovascular:  Negative for chest pain, palpitations and peripheral edema.   Gastrointestinal:  Negative for abdominal pain, constipation, diarrhea, heartburn, hematochezia and nausea.   Breasts:  Negative for tenderness, breast mass and discharge.   Genitourinary:  Negative for dysuria, frequency, genital sores, hematuria, pelvic pain, urgency, vaginal bleeding and vaginal discharge.   Musculoskeletal:  Negative for arthralgias, joint swelling and myalgias.   Skin:  Negative for rash.   Neurological:  Negative for dizziness, weakness, headaches and paresthesias.   Psychiatric/Behavioral:  Negative for mood changes. The patient is not nervous/anxious.           OBJECTIVE:   /68   Pulse 106   Temp 98.4  F (36.9  C) (Tympanic)   Resp 16   Ht 1.715 m (5' 7.5\")   Wt 57.4 kg (126 lb 8 oz)   LMP 09/14/2023 (Exact Date)   SpO2 100%   BMI 19.52 kg/m    Physical Exam    Physical Exam:  General Appearance: Alert, cooperative, no distress, appears stated age   Head: Normocephalic, without obvious abnormality, atraumatic  Eyes: PERRL, conjunctiva/corneas clear, EOM's intact   Ears: Normal TM's and external ear canals, both ears  Nose:Nares normal, septum midline,mucosa normal, no drainage    Throat:Lips, mucosa, and tongue normal; teeth and gums normal  Neck: Supple, symmetrical, trachea midline, no adenopathy; "  thyroid: not enlarged, symmetric, no tenderness/mass/nodules  Back: Symmetric, no curvature, ROM normal,  Lungs: Clear to auscultation bilaterally, respirations unlabored  Breasts: No breast masses, tenderness, asymmetry, or nipple discharge.  Heart: Regular rate and rhythm, S1 and S2 normal, no murmur, rub, or gallop  Abdomen: Soft, non-tender, bowel sounds active all four quadrants,  no masses, no organomegaly  Pelvic:normal external female genitalia, normal appearing vaginal mucosa and cervix  Extremities: Extremities normal, atraumatic, no cyanosis or edema  Skin: Skin color, texture, turgor normal, no rashes or lesions  Lymph nodes: Cervical, supraclavicular, and axillary nodes normal and   Neurologic: Normal      Diagnostic Test Results:  Labs reviewed in Epic    ASSESSMENT/PLAN:   1. Routine general medical examination at a health care facility    2. Cervical cancer screening  - Pap Screen reflex to HPV if ASCUS - recommend age 25 - 29  - HPV Hold (Lab Only)    3. Dermatitis  No rash noted today.  Based on her description this sounds to be an irritant dermatitis.  Discussed hydrocortisone twice daily to the area for 1 week  - hydrocortisone 2.5 % cream; Apply topically 2 times daily To rash on inner thigh  Dispense: 30 g; Refill: 0    4. High priority for 2019-nCoV vaccine  COVID-19 vaccine given today      Patient has been advised of split billing requirements and indicates understanding: Yes      COUNSELING:  Reviewed preventive health counseling, as reflected in patient instructions        She reports that she has never smoked. She has never used smokeless tobacco.          Medina Giang MD  [facility]

## 2023-09-29 LAB
BKR LAB AP GYN ADEQUACY: NORMAL
BKR LAB AP GYN INTERPRETATION: NORMAL
BKR LAB AP HPV REFLEX: NORMAL
BKR LAB AP PREVIOUS ABNORMAL: NORMAL
PATH REPORT.COMMENTS IMP SPEC: NORMAL
PATH REPORT.COMMENTS IMP SPEC: NORMAL
PATH REPORT.RELEVANT HX SPEC: NORMAL

## 2023-10-02 LAB
HUMAN PAPILLOMA VIRUS 16 DNA: NEGATIVE
HUMAN PAPILLOMA VIRUS 18 DNA: NEGATIVE
HUMAN PAPILLOMA VIRUS FINAL DIAGNOSIS: NORMAL
HUMAN PAPILLOMA VIRUS OTHER HR: NEGATIVE

## 2023-10-03 ENCOUNTER — PATIENT OUTREACH (OUTPATIENT)
Dept: FAMILY MEDICINE | Facility: CLINIC | Age: 23
End: 2023-10-03
Payer: COMMERCIAL

## 2023-10-16 ENCOUNTER — VIRTUAL VISIT (OUTPATIENT)
Dept: GASTROENTEROLOGY | Facility: CLINIC | Age: 23
End: 2023-10-16
Attending: PHYSICIAN ASSISTANT
Payer: COMMERCIAL

## 2023-10-16 VITALS — WEIGHT: 129 LBS | HEIGHT: 68 IN | BODY MASS INDEX: 19.55 KG/M2

## 2023-10-16 DIAGNOSIS — R10.12 LUQ ABDOMINAL PAIN: Primary | ICD-10-CM

## 2023-10-16 PROCEDURE — 99212 OFFICE O/P EST SF 10 MIN: CPT | Mod: VID | Performed by: PHYSICIAN ASSISTANT

## 2023-10-16 ASSESSMENT — PAIN SCALES - GENERAL: PAINLEVEL: NO PAIN (0)

## 2023-10-16 NOTE — PROGRESS NOTES
GASTROENTEROLOGY Follow-up VIDEO VISIT    CC/REFERRING MD:    Medina Giang    REASON FOR CONSULTATION:   Mark Esquivel for   Chief Complaint   Patient presents with    RECHECK       HISTORY OF PRESENT ILLNESS:    Dede Robertson is a 23 year old female who is being evaluated via a billable video visit for a brief follow-up.  To review, I initially met with patient in May of this year for intermittent epigastric and left upper quadrant abdominal pain.  The described character of the pain and the fact that omeprazole has been helpful made me think that it was likely related to gastritis/peptic ulcer disease.  She had otherwise no alarm symptoms and so I recommended a 2-month course of omeprazole.  She did complete this and she has felt well for the past few months now, not currently having any symptoms.  She is continuing to take a probiotic.  No other new health concerns.      I have reviewed and updated the patient's Past Medical History, Social History, Family History and Medication List.    Exam:    General appearance:  Healthy appearing adult, in no acute distress  Eyes:  Sclera anicteric, Pupils round and reactive to light  Ears, nose, mouth and throat:  No obvious external lesions of ears and nose.  Hearing intact  Neck:  Symmetric, No obvious external lesions  Respiratory:  Normal respiration, no use of accessory muscles   MSK:  No visual upper extremity, neck or facial muscle atrophy  ABD:  No visual abdominal distention, no audible borborygmi  Skin:  No rashes or jaundice   Psychiatric:  Oriented to person, place and time, Appropriate mood and affect.   Neurologic:  Peripheral muscle function and dexterity appear to be intact      PERTINENT STUDIES have been reviewed.    ASSESSMENT/PLAN:    Dede Robertson is a 23 year old female who presents for follow up of intermittent epigastric and left upper quadrant pain suggestive of gastritis/peptic ulcer disease.  After taking  omeprazole continuously for couple months, she has felt well for the past few months.  She does note that her symptoms tend to have cycled over the year, tend to be worse in the fall and winter.  We did spend some time discussing potential triggers for gastritis and advised that she keep an eye on this as we get into the winter months.  If she gets any recurrence of symptoms, I would suggest either urgent EGD to assess this further versus just repeating another course of omeprazole.  I think the chances of the etiology of this being sinister is low.  She was recommended to follow-up with me as needed and message me over the MyChart with any questions.    There are no diagnoses linked to this encounter.    Video-Visit Details    Video Visit Time: 5 minutes    Type of service:  Video Visit    Originating Location (pt. Location): Home    Distant Location (provider location):  Off-site    Platform used for Video Visit: [employer]    A total of 10 minutes was spent with reviewing the chart, discussing with the patient, documentation and coordination of care.    Mark Esquivel PA-C  Division of Gastroenterology, Hepatology, and Nutrition  [facility]  [phone]    RTC PRN

## 2023-10-16 NOTE — NURSING NOTE
Is the patient currently in the state of [place]? YES    Visit mode:VIDEO    If the visit is dropped, the patient can be reconnected by: VIDEO VISIT: Send to e-mail at: [email or web address]    Will anyone else be joining the visit? NO  (If patient encounters technical issues they should call [phone])    How would you like to obtain your AVS? MyChart    Are changes needed to the allergy or medication list? No    Reason for visit: ACOSTA HERNANDEZ

## 2024-09-11 ENCOUNTER — PATIENT OUTREACH (OUTPATIENT)
Dept: FAMILY MEDICINE | Facility: CLINIC | Age: 24
End: 2024-09-11
Payer: COMMERCIAL

## 2024-10-01 SDOH — HEALTH STABILITY: PHYSICAL HEALTH: ON AVERAGE, HOW MANY MINUTES DO YOU ENGAGE IN EXERCISE AT THIS LEVEL?: 40 MIN

## 2024-10-01 SDOH — HEALTH STABILITY: PHYSICAL HEALTH: ON AVERAGE, HOW MANY DAYS PER WEEK DO YOU ENGAGE IN MODERATE TO STRENUOUS EXERCISE (LIKE A BRISK WALK)?: 5 DAYS

## 2024-10-01 ASSESSMENT — SOCIAL DETERMINANTS OF HEALTH (SDOH): HOW OFTEN DO YOU GET TOGETHER WITH FRIENDS OR RELATIVES?: TWICE A WEEK

## 2024-10-04 ENCOUNTER — OFFICE VISIT (OUTPATIENT)
Dept: FAMILY MEDICINE | Facility: CLINIC | Age: 24
End: 2024-10-04
Payer: COMMERCIAL

## 2024-10-04 VITALS
RESPIRATION RATE: 16 BRPM | DIASTOLIC BLOOD PRESSURE: 62 MMHG | HEART RATE: 107 BPM | HEIGHT: 68 IN | BODY MASS INDEX: 20.23 KG/M2 | OXYGEN SATURATION: 100 % | SYSTOLIC BLOOD PRESSURE: 100 MMHG | WEIGHT: 133.5 LBS | TEMPERATURE: 99.1 F

## 2024-10-04 DIAGNOSIS — L30.9 DERMATITIS: ICD-10-CM

## 2024-10-04 DIAGNOSIS — Z12.4 CERVICAL CANCER SCREENING: ICD-10-CM

## 2024-10-04 DIAGNOSIS — Z00.00 ROUTINE GENERAL MEDICAL EXAMINATION AT A HEALTH CARE FACILITY: Primary | ICD-10-CM

## 2024-10-04 DIAGNOSIS — R19.5 LOOSE STOOLS: ICD-10-CM

## 2024-10-04 DIAGNOSIS — F41.9 ANXIETY: ICD-10-CM

## 2024-10-04 LAB
ALBUMIN SERPL BCG-MCNC: 4.6 G/DL (ref 3.5–5.2)
ALP SERPL-CCNC: 58 U/L (ref 40–150)
ALT SERPL W P-5'-P-CCNC: 12 U/L (ref 0–50)
ANION GAP SERPL CALCULATED.3IONS-SCNC: 12 MMOL/L (ref 7–15)
AST SERPL W P-5'-P-CCNC: 23 U/L (ref 0–45)
BILIRUB SERPL-MCNC: 0.3 MG/DL
BUN SERPL-MCNC: 12 MG/DL (ref 6–20)
CALCIUM SERPL-MCNC: 9.8 MG/DL (ref 8.8–10.4)
CHLORIDE SERPL-SCNC: 102 MMOL/L (ref 98–107)
CREAT SERPL-MCNC: 0.84 MG/DL (ref 0.51–0.95)
EGFRCR SERPLBLD CKD-EPI 2021: >90 ML/MIN/1.73M2
ERYTHROCYTE [DISTWIDTH] IN BLOOD BY AUTOMATED COUNT: 12.3 % (ref 10–15)
GLUCOSE SERPL-MCNC: 105 MG/DL (ref 70–99)
HCO3 SERPL-SCNC: 26 MMOL/L (ref 22–29)
HCT VFR BLD AUTO: 37.9 % (ref 35–47)
HGB BLD-MCNC: 12.7 G/DL (ref 11.7–15.7)
IRON BINDING CAPACITY (ROCHE): 337 UG/DL (ref 240–430)
IRON SATN MFR SERPL: 26 % (ref 15–46)
IRON SERPL-MCNC: 89 UG/DL (ref 37–145)
MCH RBC QN AUTO: 30.1 PG (ref 26.5–33)
MCHC RBC AUTO-ENTMCNC: 33.5 G/DL (ref 31.5–36.5)
MCV RBC AUTO: 90 FL (ref 78–100)
PLATELET # BLD AUTO: 286 10E3/UL (ref 150–450)
POTASSIUM SERPL-SCNC: 4.2 MMOL/L (ref 3.4–5.3)
PROT SERPL-MCNC: 7.5 G/DL (ref 6.4–8.3)
RBC # BLD AUTO: 4.22 10E6/UL (ref 3.8–5.2)
SODIUM SERPL-SCNC: 140 MMOL/L (ref 135–145)
WBC # BLD AUTO: 9.5 10E3/UL (ref 4–11)

## 2024-10-04 PROCEDURE — 87389 HIV-1 AG W/HIV-1&-2 AB AG IA: CPT | Performed by: FAMILY MEDICINE

## 2024-10-04 PROCEDURE — 90471 IMMUNIZATION ADMIN: CPT | Performed by: FAMILY MEDICINE

## 2024-10-04 PROCEDURE — 90480 ADMN SARSCOV2 VAC 1/ONLY CMP: CPT | Performed by: FAMILY MEDICINE

## 2024-10-04 PROCEDURE — 83540 ASSAY OF IRON: CPT | Performed by: FAMILY MEDICINE

## 2024-10-04 PROCEDURE — 83550 IRON BINDING TEST: CPT | Performed by: FAMILY MEDICINE

## 2024-10-04 PROCEDURE — 90656 IIV3 VACC NO PRSV 0.5 ML IM: CPT | Performed by: FAMILY MEDICINE

## 2024-10-04 PROCEDURE — 36415 COLL VENOUS BLD VENIPUNCTURE: CPT | Performed by: FAMILY MEDICINE

## 2024-10-04 PROCEDURE — G0145 SCR C/V CYTO,THINLAYER,RESCR: HCPCS | Performed by: FAMILY MEDICINE

## 2024-10-04 PROCEDURE — 85027 COMPLETE CBC AUTOMATED: CPT | Performed by: FAMILY MEDICINE

## 2024-10-04 PROCEDURE — 99459 PELVIC EXAMINATION: CPT | Performed by: FAMILY MEDICINE

## 2024-10-04 PROCEDURE — 80053 COMPREHEN METABOLIC PANEL: CPT | Performed by: FAMILY MEDICINE

## 2024-10-04 PROCEDURE — 91320 SARSCV2 VAC 30MCG TRS-SUC IM: CPT | Performed by: FAMILY MEDICINE

## 2024-10-04 PROCEDURE — 99395 PREV VISIT EST AGE 18-39: CPT | Mod: 25 | Performed by: FAMILY MEDICINE

## 2024-10-04 PROCEDURE — 99214 OFFICE O/P EST MOD 30 MIN: CPT | Mod: 25 | Performed by: FAMILY MEDICINE

## 2024-10-04 PROCEDURE — 87624 HPV HI-RISK TYP POOLED RSLT: CPT | Performed by: FAMILY MEDICINE

## 2024-10-04 RX ORDER — FLUOXETINE 10 MG/1
CAPSULE ORAL
Qty: 50 CAPSULE | Refills: 0 | Status: SHIPPED | OUTPATIENT
Start: 2024-10-04 | End: 2024-11-03

## 2024-10-04 NOTE — PROGRESS NOTES
Preventive Care Visit  [facility] JOSE ALFREDO Giang MD, Family Medicine  Oct 4, 2024      Assessment & Plan     Routine general medical examination at a health care facility  - Comprehensive metabolic panel (BMP + Alb, Alk Phos, ALT, AST, Total. Bili, TP); Future  - HIV Antigen Antibody Combo; Future  - CBC with platelets; Future  - Iron and iron binding capacity; Future  - Comprehensive metabolic panel (BMP + Alb, Alk Phos, ALT, AST, Total. Bili, TP)  - CBC with platelets  - Iron and iron binding capacity  - HIV Antigen Antibody Combo    Cervical cancer screening  - Pap Screen Reflex to HPV if ASCUS - Recommended Age 25 - 29 Years    Anxiety  Initiate fluoxetine.  10 mg daily for 10 days and then increase to 20 mg daily.  She will contact me in 3 to 4 weeks to let me know how she is doing and for refills.  - FLUoxetine (PROZAC) 10 MG capsule; Take 1 capsule (10 mg) by mouth daily for 10 days, THEN 2 capsules (20 mg) daily for 20 days.    Dermatitis  Left armpit.  Likely due to different deodorant.  Patient has hydrocortisone cream that she can use.  Hold off on deodorant for a few days and then can start using again.    Loose stools  Potentially due to switch to almond milk.  Recommended switching back to cows milk or trialing a different source.    Patient has been advised of split billing requirements and indicates understanding: Yes        Counseling  Appropriate preventive services were addressed with this patient via screening, questionnaire, or discussion as appropriate for fall prevention, nutrition, physical activity, Tobacco-use cessation, social engagement, weight loss and cognition.  Checklist reviewing preventive services available has been given to the patient.  Reviewed patient's diet, addressing concerns and/or questions.           Jona Aguayo is a 24 year old, presenting for the following:  Physical (Pap smear due) and Anxiety       Health Care Directive  Patient  does not have a Health Care Directive or Living Will: Discussed advance care planning with patient; however, patient declined at this time.    HAM Ruiz is overall doing well.  She recently got a new job at [employer].  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    She has been noting that she has had increased anxiety recently.  She has a history of anxiety.  She is never been on medications for.  Her boyfriend of 10 years also has anxiety and he was started on Lexapro and feels much better.  She is wondering about medication treatment for her anxiety.    She is not having panic attacks necessarily but just notes that she feels generally anxious and feels tired of feeling anxious.    She also notes that she has a rash under her left armpit.  Upon questioning she does note that she was a different deodorant for about a week when hers ran out.  This is not on her right armpit.  It is slightly itchy.    Loose stools: Patient has a history of some GI issues.  She currently takes align probiotic and thinks that that helps.  She also notes that she has been having some loose stools recently.  Sometimes she will have 2 or 3 stools a day.  They are not diarrhea per se.  Sometimes she feels as though she needs to wipe several times for complete cleanliness.              10/1/2024   General Health   How would you rate your overall physical health? Excellent   Feel stress (tense, anxious, or unable to sleep) To some extent      (!) STRESS CONCERN      10/1/2024   Nutrition   Three or more servings of calcium each day? Yes   Diet: Other   If other, please elaborate: I don t eat a lot of meat, mostly chicken when I do. I drink oatmilk instead of dairy milk, but still eat cheese and dairy otherwise.   How many servings of fruit and vegetables per day? (!) 2-3   How many sweetened beverages each day? 0-1            10/1/2024   Exercise   Days per week of moderate/strenous exercise 5 days   Average minutes spent exercising at  this level 40 min            10/1/2024   Social Factors   Frequency of gathering with friends or relatives Twice a week   Worry food won't last until get money to buy more No   Food not last or not have enough money for food? No   Do you have housing? (Housing is defined as stable permanent housing and does not include staying ouside in a car, in a tent, in an abandoned building, in an overnight shelter, or couch-surfing.) Yes   Are you worried about losing your housing? No   Lack of transportation? No   Unable to get utilities (heat,electricity)? No            10/1/2024   Dental   Dentist two times every year? Yes            10/1/2024   TB Screening   Were you born outside of the [place]? No            Today's PHQ-2 Score:       10/4/2024     2:41 PM   PHQ-2 ( 1999 [employer])   Q1: Little interest or pleasure in doing things 0   Q2: Feeling down, depressed or hopeless 0   PHQ-2 Score 0   Q1: Little interest or pleasure in doing things Not at all   Q2: Feeling down, depressed or hopeless Not at all   PHQ-2 Score 0           10/1/2024   Substance Use   Alcohol more than 3/day or more than 7/wk No   Do you use any other substances recreationally? No        Social History     Tobacco Use    Smoking status: Never    Smokeless tobacco: Never   Vaping Use    Vaping status: Never Used   Substance Use Topics    Alcohol use: No    Drug use: No           3/6/2023   LAST FHS-7 RESULTS   1st degree relative breast or ovarian cancer No   Any relative bilateral breast cancer No   Any male have breast cancer No   Any ONE woman have BOTH breast AND ovarian cancer No   Any woman with breast cancer before 50yrs No   2 or more relatives with breast AND/OR ovarian cancer No   2 or more relatives with breast AND/OR bowel cancer No           Mammogram Screening - Patient under 40 years of age: Routine Mammogram Screening not recommended.         10/1/2024   STI Screening   New sexual partner(s) since last STI/HIV test? No        History of  "abnormal Pap smear: YES - reflected in Problem List and Health Maintenance accordingly        Latest Ref Rng & Units 9/27/2023    11:27 AM 9/27/2022    10:32 AM 7/17/2019     2:09 PM   PAP / HPV   PAP  Negative for Intraepithelial Lesion or Malignancy (NILM)  Low-grade squamous intraepithelial lesion (LSIL) encompassing HPV/mild dysplasia/CIN1  Negative for squamous intraepithelial lesion or malignancy  Electronically signed by Sarah Ma CT (ASCP) on 7/18/2019 at 12:17 PM      HPV 16 DNA Negative Negative      HPV 18 DNA Negative Negative      Other HR HPV Negative Negative              10/1/2024   Contraception/Family Planning   Questions about contraception or family planning (!) YES            Reviewed and updated as needed this visit by Provider                    No past medical history on file.      Review of Systems  Constitutional, HEENT, cardiovascular, pulmonary, GI, , musculoskeletal, neuro, skin, endocrine and psych systems are negative, except as otherwise noted.     Objective    Exam  /62   Pulse 107   Temp 99.1  F (37.3  C) (Tympanic)   Resp 16   Ht 1.72 m (5' 7.72\")   Wt 60.6 kg (133 lb 8 oz)   LMP 10/01/2024 (Exact Date)   SpO2 100%   BMI 20.47 kg/m     Estimated body mass index is 20.47 kg/m  as calculated from the following:    Height as of this encounter: 1.72 m (5' 7.72\").    Weight as of this encounter: 60.6 kg (133 lb 8 oz).    Physical Exam    Physical Exam:  General Appearance: Alert, cooperative, no distress, appears stated age   Head: Normocephalic, without obvious abnormality, atraumatic  Eyes: PERRL, conjunctiva/corneas clear, EOM's intact   Ears: Normal TM's and external ear canals, both ears  Nose:Nares normal, septum midline,mucosa normal, no drainage    Throat:Lips, mucosa, and tongue normal; teeth and gums normal  Neck: Supple, symmetrical, trachea midline, no adenopathy;  thyroid: not enlarged, symmetric, no tenderness/mass/nodules  Back: Symmetric, no " curvature, ROM normal,  Lungs: Clear to auscultation bilaterally, respirations unlabored  Breasts: No breast masses, tenderness, asymmetry, or nipple discharge.  Heart: Regular rate and rhythm, S1 and S2 normal, no murmur, rub, or gallop  Abdomen: Soft, non-tender, bowel sounds active all four quadrants,  no masses, no organomegaly  Pelvic:normal external female genitalia, normal appearing vaginal mucosa and cervix, small amount of menstrual bleeding noted  Extremities: Extremities normal, atraumatic, no cyanosis or edema  Skin: Skin color, texture, turgor normal, no rashes or lesions  Lymph nodes: Cervical, supraclavicular, and axillary nodes normal and   Neurologic: Normal          Signed Electronically by: Medina Giang MD

## 2024-10-04 NOTE — PATIENT INSTRUCTIONS
Patient Education   Preventive Care Advice   This is general advice given by our system to help you stay healthy. However, your care team may have specific advice just for you. Please talk to your care team about your preventive care needs.  Nutrition  Eat 5 or more servings of fruits and vegetables each day.  Try wheat bread, brown rice and whole grain pasta (instead of white bread, rice, and pasta).  Get enough calcium and vitamin D. Check the label on foods and aim for 100% of the RDA (recommended daily allowance).  Lifestyle  Exercise at least 150 minutes each week  (30 minutes a day, 5 days a week).  Do muscle strengthening activities 2 days a week. These help control your weight and prevent disease.  No smoking.  Wear sunscreen to prevent skin cancer.  Have a dental exam and cleaning every 6 months.  Yearly exams  See your health care team every year to talk about:  Any changes in your health.  Any medicines your care team has prescribed.  Preventive care, family planning, and ways to prevent chronic diseases.  Shots (vaccines)   HPV shots (up to age 26), if you've never had them before.  Hepatitis B shots (up to age 59), if you've never had them before.  COVID-19 shot: Get this shot when it's due.  Flu shot: Get a flu shot every year.  Tetanus shot: Get a tetanus shot every 10 years.  Pneumococcal, hepatitis A, and RSV shots: Ask your care team if you need these based on your risk.  Shingles shot (for age 50 and up)  General health tests  Diabetes screening:  Starting at age 35, Get screened for diabetes at least every 3 years.  If you are younger than age 35, ask your care team if you should be screened for diabetes.  Cholesterol test: At age 39, start having a cholesterol test every 5 years, or more often if advised.  Bone density scan (DEXA): At age 50, ask your care team if you should have this scan for osteoporosis (brittle bones).  Hepatitis C: Get tested at least once in your life.  STIs (sexually  transmitted infections)  Before age 24: Ask your care team if you should be screened for STIs.  After age 24: Get screened for STIs if you're at risk. You are at risk for STIs (including HIV) if:  You are sexually active with more than one person.  You don't use condoms every time.  You or a partner was diagnosed with a sexually transmitted infection.  If you are at risk for HIV, ask about PrEP medicine to prevent HIV.  Get tested for HIV at least once in your life, whether you are at risk for HIV or not.  Cancer screening tests  Cervical cancer screening: If you have a cervix, begin getting regular cervical cancer screening tests starting at age 21.  Breast cancer scan (mammogram): If you've ever had breasts, begin having regular mammograms starting at age 40. This is a scan to check for breast cancer.  Colon cancer screening: It is important to start screening for colon cancer at age 45.  Have a colonoscopy test every 10 years (or more often if you're at risk) Or, ask your provider about stool tests like a FIT test every year or Cologuard test every 3 years.  To learn more about your testing options, visit:   .  For help making a decision, visit:   https://bit.ly/cb71689.  Prostate cancer screening test: If you have a prostate, ask your care team if a prostate cancer screening test (PSA) at age 55 is right for you.  Lung cancer screening: If you are a current or former smoker ages 50 to 80, ask your care team if ongoing lung cancer screenings are right for you.  For informational purposes only. Not to replace the advice of your health care provider. Copyright   2023 [employer]. All rights reserved. Clinically reviewed by the [facility] Transitions Program. Diarize [id number] - REV 01/24.

## 2024-10-05 LAB — HIV 1+2 AB+HIV1 P24 AG SERPL QL IA: NONREACTIVE

## 2024-10-09 LAB
BKR LAB AP GYN ADEQUACY: NORMAL
BKR LAB AP GYN INTERPRETATION: NORMAL
BKR LAB AP HPV REFLEX: NORMAL
BKR LAB AP PREVIOUS ABNL DX: NORMAL
BKR LAB AP PREVIOUS ABNORMAL: NORMAL
HPV HR 12 DNA CVX QL NAA+PROBE: NEGATIVE
HPV16 DNA CVX QL NAA+PROBE: NEGATIVE
HPV18 DNA CVX QL NAA+PROBE: NEGATIVE
HUMAN PAPILLOMA VIRUS FINAL DIAGNOSIS: NORMAL
PATH REPORT.COMMENTS IMP SPEC: NORMAL
PATH REPORT.COMMENTS IMP SPEC: NORMAL
PATH REPORT.RELEVANT HX SPEC: NORMAL

## 2024-10-31 ENCOUNTER — MYC MEDICAL ADVICE (OUTPATIENT)
Dept: FAMILY MEDICINE | Facility: CLINIC | Age: 24
End: 2024-10-31
Payer: COMMERCIAL

## 2024-10-31 DIAGNOSIS — F41.9 ANXIETY: Primary | ICD-10-CM
